# Patient Record
Sex: MALE | Race: OTHER | HISPANIC OR LATINO | Employment: FULL TIME | URBAN - METROPOLITAN AREA
[De-identification: names, ages, dates, MRNs, and addresses within clinical notes are randomized per-mention and may not be internally consistent; named-entity substitution may affect disease eponyms.]

---

## 2021-07-13 ENCOUNTER — OFFICE VISIT (OUTPATIENT)
Dept: FAMILY MEDICINE CLINIC | Facility: CLINIC | Age: 31
End: 2021-07-13
Payer: COMMERCIAL

## 2021-07-13 VITALS
DIASTOLIC BLOOD PRESSURE: 80 MMHG | OXYGEN SATURATION: 99 % | BODY MASS INDEX: 29.92 KG/M2 | WEIGHT: 202 LBS | RESPIRATION RATE: 16 BRPM | HEIGHT: 69 IN | SYSTOLIC BLOOD PRESSURE: 122 MMHG | HEART RATE: 78 BPM

## 2021-07-13 DIAGNOSIS — M54.2 NECK PAIN: ICD-10-CM

## 2021-07-13 DIAGNOSIS — Z13.1 SCREENING FOR DIABETES MELLITUS: ICD-10-CM

## 2021-07-13 DIAGNOSIS — Z13.6 SCREENING FOR CARDIOVASCULAR CONDITION: ICD-10-CM

## 2021-07-13 DIAGNOSIS — R42 SEVERE DIZZINESS: Primary | ICD-10-CM

## 2021-07-13 DIAGNOSIS — N32.89 BLADDER WALL THICKENING: ICD-10-CM

## 2021-07-13 DIAGNOSIS — R07.2 PRECORDIAL PAIN: ICD-10-CM

## 2021-07-13 DIAGNOSIS — Z11.4 SCREENING FOR HIV (HUMAN IMMUNODEFICIENCY VIRUS): ICD-10-CM

## 2021-07-13 DIAGNOSIS — R39.15 URGENCY OF MICTURITION: ICD-10-CM

## 2021-07-13 PROCEDURE — 3725F SCREEN DEPRESSION PERFORMED: CPT | Performed by: FAMILY MEDICINE

## 2021-07-13 PROCEDURE — 99204 OFFICE O/P NEW MOD 45 MIN: CPT | Performed by: FAMILY MEDICINE

## 2021-07-13 PROCEDURE — 3008F BODY MASS INDEX DOCD: CPT | Performed by: FAMILY MEDICINE
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
## 2021-07-14 LAB
ALBUMIN SERPL-MCNC: 4.9 G/DL (ref 3.6–5.1)
ALBUMIN/GLOB SERPL: 1.7 (CALC) (ref 1–2.5)
ALP SERPL-CCNC: 75 U/L (ref 36–130)
ALT SERPL-CCNC: 36 U/L (ref 9–46)
AST SERPL-CCNC: 24 U/L (ref 10–40)
BASOPHILS # BLD AUTO: 20 CELLS/UL (ref 0–200)
BASOPHILS NFR BLD AUTO: 0.3 %
BILIRUB SERPL-MCNC: 0.7 MG/DL (ref 0.2–1.2)
BUN SERPL-MCNC: 16 MG/DL (ref 7–25)
BUN/CREAT SERPL: NORMAL (CALC) (ref 6–22)
CALCIUM SERPL-MCNC: 9.6 MG/DL (ref 8.6–10.3)
CHLORIDE SERPL-SCNC: 103 MMOL/L (ref 98–110)
CHOLEST SERPL-MCNC: 188 MG/DL
CHOLEST/HDLC SERPL: 4.1 (CALC)
CO2 SERPL-SCNC: 27 MMOL/L (ref 20–32)
CREAT SERPL-MCNC: 0.81 MG/DL (ref 0.6–1.35)
EOSINOPHIL # BLD AUTO: 20 CELLS/UL (ref 15–500)
EOSINOPHIL NFR BLD AUTO: 0.3 %
ERYTHROCYTE [DISTWIDTH] IN BLOOD BY AUTOMATED COUNT: 12.5 % (ref 11–15)
GLOBULIN SER CALC-MCNC: 2.9 G/DL (CALC) (ref 1.9–3.7)
GLUCOSE SERPL-MCNC: 96 MG/DL (ref 65–99)
HBA1C MFR BLD: 4.9 % OF TOTAL HGB
HCT VFR BLD AUTO: 47.1 % (ref 38.5–50)
HDLC SERPL-MCNC: 46 MG/DL
HGB BLD-MCNC: 15.9 G/DL (ref 13.2–17.1)
HIV 1+2 AB+HIV1 P24 AG SERPL QL IA: NORMAL
LDLC SERPL CALC-MCNC: 124 MG/DL (CALC)
LYMPHOCYTES # BLD AUTO: 1604 CELLS/UL (ref 850–3900)
LYMPHOCYTES NFR BLD AUTO: 24.3 %
MCH RBC QN AUTO: 28.6 PG (ref 27–33)
MCHC RBC AUTO-ENTMCNC: 33.8 G/DL (ref 32–36)
MCV RBC AUTO: 84.9 FL (ref 80–100)
MONOCYTES # BLD AUTO: 455 CELLS/UL (ref 200–950)
MONOCYTES NFR BLD AUTO: 6.9 %
NEUTROPHILS # BLD AUTO: 4501 CELLS/UL (ref 1500–7800)
NEUTROPHILS NFR BLD AUTO: 68.2 %
NONHDLC SERPL-MCNC: 142 MG/DL (CALC)
PLATELET # BLD AUTO: 163 THOUSAND/UL (ref 140–400)
PMV BLD REES-ECKER: 12.1 FL (ref 7.5–12.5)
POTASSIUM SERPL-SCNC: 4.6 MMOL/L (ref 3.5–5.3)
PROT SERPL-MCNC: 7.8 G/DL (ref 6.1–8.1)
RBC # BLD AUTO: 5.55 MILLION/UL (ref 4.2–5.8)
SL AMB EGFR AFRICAN AMERICAN: 137 ML/MIN/1.73M2
SL AMB EGFR NON AFRICAN AMERICAN: 118 ML/MIN/1.73M2
SODIUM SERPL-SCNC: 138 MMOL/L (ref 135–146)
TRIGL SERPL-MCNC: 81 MG/DL
TSH SERPL-ACNC: 1.63 MIU/L (ref 0.4–4.5)
WBC # BLD AUTO: 6.6 THOUSAND/UL (ref 3.8–10.8)

## 2021-07-28 ENCOUNTER — TELEPHONE (OUTPATIENT)
Dept: NEUROLOGY | Facility: CLINIC | Age: 31
End: 2021-07-28

## 2021-07-28 NOTE — TELEPHONE ENCOUNTER
Best contact number for patient:  [phone]  Emergency Contact name and number:  Keith Durand: [phone]  Referring provider and telephone number:  Adina Ramon 79 Provider Name and if affiliated with [facility]:     Reason for Appointment/Dx:dizziness    Have you seen and followed up with a pediatric Neurologist for this disease in the past?      No (If yes ok to schedule with Dr Kim Daniel)    Neurology Location patient would like to be seen:    Order received? Yes                                                 Records Received? Yes    Have you ever seen another Neurologist?       No    Insurance Information    Insurance Name:    ID/Policy #:    Secondary Insurance:    ID/Policy#: Workman's Comp/ Accident/ School  Information      Workman's Comp/Accident/School related? No    If yes name of Insurance company:    Claim #:    Date of Injury:    Type of Injury:     Name and Telephone Number:    Notes:                   Appointment date: 9/9/21 @ 3pm, w/Dr Cedric Morin in [place]  Verified insurance/address/phone-all current  Sent appt details/directions

## 2021-07-29 ENCOUNTER — OFFICE VISIT (OUTPATIENT)
Dept: FAMILY MEDICINE CLINIC | Facility: CLINIC | Age: 31
End: 2021-07-29
Payer: COMMERCIAL

## 2021-07-29 VITALS
BODY MASS INDEX: 30.07 KG/M2 | WEIGHT: 203 LBS | OXYGEN SATURATION: 98 % | RESPIRATION RATE: 16 BRPM | HEART RATE: 80 BPM | DIASTOLIC BLOOD PRESSURE: 70 MMHG | SYSTOLIC BLOOD PRESSURE: 118 MMHG | HEIGHT: 69 IN

## 2021-07-29 DIAGNOSIS — R07.2 PRECORDIAL PAIN: ICD-10-CM

## 2021-07-29 DIAGNOSIS — E78.2 MIXED HYPERLIPIDEMIA: ICD-10-CM

## 2021-07-29 DIAGNOSIS — R42 SEVERE DIZZINESS: ICD-10-CM

## 2021-07-29 DIAGNOSIS — M54.2 NECK PAIN: Primary | ICD-10-CM

## 2021-07-29 PROCEDURE — 3725F SCREEN DEPRESSION PERFORMED: CPT | Performed by: FAMILY MEDICINE

## 2021-07-29 PROCEDURE — 99214 OFFICE O/P EST MOD 30 MIN: CPT | Performed by: FAMILY MEDICINE

## 2021-07-29 NOTE — ASSESSMENT & PLAN NOTE
Lab Results   Component Value Date    LDLCALC 124 (H) 07/13/2021    advised low-fat diet and try to maintain his weight in good range, his BMI is 30 4   will do yearly labs

## 2021-07-29 NOTE — PROGRESS NOTES
Assessment/Plan:    Problem List Items Addressed This Visit        Other    Neck pain - Primary    Relevant Orders    XR spine cervical 2 or 3 vw injury    Severe dizziness      He says he had MRI of the brain in [facility], he will get the report, he has made appointment with the neurologist and if he has severe symptoms he can go to the er,   will get x-ray of the neck as his symptoms started from the left side of the neck,   he has labs LDL is high but otherwise every other lab is normal         Precordial pain      Pain starts from the neck and goes to was the precordium, he has made appointment with the cardiologist at that time he gets palpitation         Mixed hyperlipidemia     Lab Results   Component Value Date    [address] 124 (H) 07/13/2021    advised low-fat diet and try to maintain his weight in good range, his BMI is 30 4   will do yearly labs            six-month follow-up    Chief Complaint   Patient presents with    Follow-up        Subjective:   Patient ID: Olvin Lamas is a 31 y o  male  Follow-up on his labs, he has symptom of severe dizziness which comes from the left side of the neck pain and pain goes to the left side of the chest and arm, he has made appointment with cardiologist and neurologist, when this episode happen it can last for hours, he says, in the past about 1 year ago he had MRI of the brain which was normal       Review of Systems   Constitutional: Negative for activity change, appetite change, chills, fatigue, fever and unexpected weight change  HENT: Negative for congestion, ear discharge, ear pain, nosebleeds, postnasal drip, rhinorrhea, sinus pressure, sneezing, sore throat, trouble swallowing and voice change  Eyes: Negative for photophobia, pain, discharge, redness and itching  Respiratory: Negative for cough, chest tightness, shortness of breath and wheezing  Cardiovascular: Negative for chest pain, palpitations and leg swelling     Gastrointestinal: Negative for abdominal pain, constipation, diarrhea, nausea and vomiting  Endocrine: Negative for polyuria  Genitourinary: Negative for dysuria, frequency and urgency  Musculoskeletal: Negative for arthralgias, back pain, myalgias and neck pain  Skin: Negative for color change, pallor and rash  Allergic/Immunologic: Negative for environmental allergies and food allergies  Neurological: Positive for dizziness  Negative for seizures, speech difficulty, weakness, light-headedness, numbness and headaches  Hematological: Negative for adenopathy  Does not bruise/bleed easily  Psychiatric/Behavioral: Negative for behavioral problems  The patient is not nervous/anxious  Objective:  Physical Exam  Vitals and nursing note reviewed  Constitutional:       Appearance: He is well-developed  HENT:      Head: Normocephalic and atraumatic  Eyes:      General: No scleral icterus  Right eye: No discharge  Left eye: No discharge  Extraocular Movements: Extraocular movements intact  Neck:      Thyroid: No thyromegaly  Trachea: No tracheal deviation  Cardiovascular:      Rate and Rhythm: Normal rate and regular rhythm  Heart sounds: Normal heart sounds  No murmur heard  Pulmonary:      Effort: Pulmonary effort is normal  No respiratory distress  Breath sounds: Normal breath sounds  No wheezing or rales  Musculoskeletal:         General: No swelling, tenderness, deformity or signs of injury  Normal range of motion  Cervical back: Normal range of motion and neck supple  No rigidity or tenderness  Right lower leg: No edema  Left lower leg: No edema  Lymphadenopathy:      Cervical: No cervical adenopathy  Skin:     General: Skin is warm  Coloration: Skin is not pale  Findings: No erythema or rash  Neurological:      General: No focal deficit present  Mental Status: He is alert and oriented to person, place, and time  Cranial Nerves:  No cranial nerve deficit  Deep Tendon Reflexes: Reflexes are normal and symmetric  Comments:  Does not have any tingling numbness in the arm   Psychiatric:         Behavior: Behavior normal          Thought Content: Thought content normal          Judgment: Judgment normal             History reviewed  No pertinent surgical history  Family History   Problem Relation Age of Onset    No Known Problems Mother     No Known Problems Father        No current outpatient medications on file      No Known Allergies    Vitals:    07/29/21 0759   BP: 118/70   Pulse: 80   Resp: 16   SpO2: 98%   Weight: 92 1 kg (203 lb)   Height: 5' 8 5" (1 74 m)

## 2021-07-29 NOTE — ASSESSMENT & PLAN NOTE
Pain starts from the neck and goes to was the precordium, he has made appointment with the cardiologist at that time he gets palpitation

## 2021-07-29 NOTE — ASSESSMENT & PLAN NOTE
He says he had MRI of the brain in [facility], he will get the report, he has made appointment with the neurologist and if he has severe symptoms he can go to the er,   will get x-ray of the neck as his symptoms started from the left side of the neck,   he has labs LDL is high but otherwise every other lab is normal

## 2021-08-03 ENCOUNTER — CONSULT (OUTPATIENT)
Dept: CARDIOLOGY CLINIC | Facility: CLINIC | Age: 31
End: 2021-08-03
Payer: COMMERCIAL

## 2021-08-03 ENCOUNTER — APPOINTMENT (OUTPATIENT)
Dept: RADIOLOGY | Facility: CLINIC | Age: 31
End: 2021-08-03
Payer: COMMERCIAL

## 2021-08-03 VITALS
DIASTOLIC BLOOD PRESSURE: 74 MMHG | HEART RATE: 80 BPM | TEMPERATURE: 98.9 F | HEIGHT: 69 IN | WEIGHT: 204 LBS | SYSTOLIC BLOOD PRESSURE: 114 MMHG | BODY MASS INDEX: 30.21 KG/M2 | OXYGEN SATURATION: 98 %

## 2021-08-03 DIAGNOSIS — R07.2 PRECORDIAL PAIN: Primary | ICD-10-CM

## 2021-08-03 DIAGNOSIS — R42 DIZZINESS: ICD-10-CM

## 2021-08-03 DIAGNOSIS — M54.2 NECK PAIN: ICD-10-CM

## 2021-08-03 DIAGNOSIS — R07.9 CHEST PAIN IN ADULT: ICD-10-CM

## 2021-08-03 PROCEDURE — 3008F BODY MASS INDEX DOCD: CPT | Performed by: INTERNAL MEDICINE

## 2021-08-03 PROCEDURE — 72040 X-RAY EXAM NECK SPINE 2-3 VW: CPT

## 2021-08-03 PROCEDURE — 1036F TOBACCO NON-USER: CPT | Performed by: INTERNAL MEDICINE

## 2021-08-03 PROCEDURE — 93000 ELECTROCARDIOGRAM COMPLETE: CPT | Performed by: INTERNAL MEDICINE

## 2021-08-03 PROCEDURE — 99213 OFFICE O/P EST LOW 20 MIN: CPT | Performed by: INTERNAL MEDICINE

## 2021-08-03 NOTE — PROGRESS NOTES
Consultation - Cardiology Office  [facility]  Johana Tucker 31 y o  male MRN: [id number]  : 1990  Unit/Bed#:  Encounter: [id number]      ASSESSMENT:  Chest pain  Starts from the neck and goes down to the precordium, accompanied by  Palpitations    X-ray of cervical spine was done today:  Results are pending    Dizziness      RECOMMENDATIONS:  48 hour Holter monitor  Echocardiogram  Exercise stress test    Patient has also been referred to neurology also by PCP      Thank you for your consultation  If you have any question please call me at [phone]      Primary Care Physician Requesting Consult: John Villa MD      Reason for Consult / Principal Problem:  Chest pain and palpitations        HPI :     Johana Tucker is a 31y o  year old male who was referred by primary care doctor for evaluation of chest pain and palpitations and sometimes dizziness  According to the patient usually starts with pain on the left side of his neck and travels down to his mid chest sometimes accompanied by palpitations and off and on dizziness  This occurs more frequently when he is driving  He does not give a history of syncope  Patient also had x-ray of his cervical spine today and is waiting for a appointment with neurologist    Review of Systems   Cardiovascular: Positive for chest pain  Neurological: Positive for dizziness and light-headedness  Historical Information   Past Medical History:   Diagnosis Date    Depression     Vertigo      No past surgical history on file    Social History     Substance and Sexual Activity   Alcohol Use Yes    Alcohol/week: 3 0 standard drinks    Types: 3 Glasses of wine per week     Social History     Substance and Sexual Activity   Drug Use Not Currently     Social History     Tobacco Use   Smoking Status Never Smoker   Smokeless Tobacco Never Used     Family History:   Family History   Problem Relation Age of Onset    No Known Problems Mother  No Known Problems Father        Meds/Allergies     No Known Allergies  No current outpatient medications on file  Vitals:   /74 mmHg  HR 80/Min  BP Readings from Last 3 Encounters:   07/29/21 118/70   07/13/21 122/80       Physical Exam  PHYSICAL EXAMINATION:  Neurologic:  Alert & oriented x 3, no new focal deficits, Not in any acute distress,  Constitutional:  Well developed, well nourished, non-toxic appearance   Eyes:  Pupil equal and reacting to light, conjunctiva normal, No JVP, No LNP   HENT:  Atraumatic, oropharynx moist, Neck- normal range of motion, no tenderness,  Neck supple   Respiratory:  Bilateral air entry, mostly clear to auscultation  Cardiovascular: S1-S2 regular with a I/VI systolic murmur   GI:  Soft, nondistended, normal bowel sounds, nontender, no hepatosplenomegaly appreciated  Musculoskeletal:  No edema, no tenderness, no deformities  Skin:  Well hydrated, no rash   Lymphatic:  No lymphadenopathy noted   Extremities:  No edema and distal pulses are present    Diagnostic Studies Review Cardio:      EKG:  Normal sinus rhythm, heart rate 80 per minute    Cardiac testing:   No results found for this or any previous visit  Imaging:  Chest X-Ray:   No Chest XR results available for this patient  CT-scan of the chest:     No CTA results available for this patient    Lab Review   Lab Results   Component Value Date    WBC 6 6 07/13/2021    HGB 15 9 07/13/2021    HCT 47 1 07/13/2021    MCV 84 9 07/13/2021    RDW 12 5 07/13/2021     07/13/2021     BMP:  Lab Results   Component Value Date    SODIUM 138 07/13/2021    K 4 6 07/13/2021     07/13/2021    CO2 27 07/13/2021    BUN 16 07/13/2021    CREATININE 0 81 07/13/2021    GLUC 96 07/13/2021    CALCIUM 9 6 07/13/2021     LFT:  Lab Results   Component Value Date    AST 24 07/13/2021    ALT 36 07/13/2021    ALKPHOS 75 07/13/2021    TP 7 8 07/13/2021    ALB 4 9 07/13/2021      No results found for: [id number]  No components found for: [employer] [facility]  Lab Results   Component Value Date    HGBA1C 4 9 07/13/2021     Lipid Profile:   Lab Results   Component Value Date    CHOLESTEROL 188 07/13/2021    HDL 46 07/13/2021    LDLCALC 124 (H) 07/13/2021    TRIG 81 07/13/2021     Lab Results   Component Value Date    CHOLESTEROL 188 07/13/2021     No results found for: CKTOTAL, CKMB, CKMBINDEX, TROPONINI  No results found for: NTBNP   Recent Results (from the past 672 hour(s))   Lipid panel    Collection Time: 07/13/21  9:55 AM   Result Value Ref Range    Total Cholesterol 188 <200 mg/dL    HDL 46 > OR = 40 mg/dL    Triglycerides 81 <150 mg/dL    LDL Calculated 124 (H) mg/dL (calc)    Chol HDLC Ratio 4 1 <5 0 (calc)    Non-HDL Cholesterol 142 (H) <130 mg/dL (calc)   Comprehensive metabolic panel    Collection Time: 07/13/21  9:55 AM   Result Value Ref Range    Glucose, Random 96 65 - 99 mg/dL    BUN 16 7 - 25 mg/dL    Creatinine 0 81 0 60 - 1 35 mg/dL    eGFR Non  118 > OR = 60 mL/min/1 73m2    eGFR  137 > OR = 60 mL/min/1 73m2    SL AMB BUN/CREATININE RATIO NOT APPLICABLE 6 - 22 (calc)    Sodium 138 135 - 146 mmol/L    Potassium 4 6 3 5 - 5 3 mmol/L    Chloride 103 98 - 110 mmol/L    CO2 27 20 - 32 mmol/L    Calcium 9 6 8 6 - 10 3 mg/dL    Protein, Total 7 8 6 1 - 8 1 g/dL    Albumin 4 9 3 6 - 5 1 g/dL    Globulin 2 9 1 9 - 3 7 g/dL (calc)    Albumin/Globulin Ratio 1 7 1 0 - 2 5 (calc)    TOTAL BILIRUBIN 0 7 0 2 - 1 2 mg/dL    Alkaline Phosphatase 75 36 - 130 U/L    AST 24 10 - 40 U/L    ALT 36 9 - 46 U/L   CBC and differential    Collection Time: 07/13/21  9:55 AM   Result Value Ref Range    White Blood Cell Count 6 6 3 8 - 10 8 Thousand/uL    Red Blood Cell Count 5 55 4 20 - 5 80 Million/uL    Hemoglobin 15 9 13 2 - 17 1 g/dL    HCT 47 1 38 5 - 50 0 %    MCV 84 9 80 0 - 100 0 fL    MCH 28 6 27 0 - 33 0 pg    MCHC 33 8 32 0 - 36 0 g/dL    RDW 12 5 11 0 - 15 0 %    Platelet Count 604 596 - 400 Thousand/uL    SL AMB MPV 12 1 7 5 - 12 5 fL    Neutrophils (Absolute) 4,501 1,500 - 7,800 cells/uL    Lymphocytes (Absolute) 1,604 850 - 3,900 cells/uL    Monocytes (Absolute) 455 200 - 950 cells/uL    Eosinophils (Absolute) 20 15 - 500 cells/uL    Basophils ABS 20 0 - 200 cells/uL    Neutrophils 68 2 %    Lymphocytes 24 3 %    Monocytes 6 9 %    Eosinophils 0 3 %    Basophils PCT 0 3 %   Human Immunodeficiency Virus 1/2 Antigen / Antibody ( Fourth Generation) with Reflex Testing    Collection Time: 07/13/21  9:55 AM   Result Value Ref Range    HIV AG/AB, 4th Gen NON-REACTIVE NON-REACTIVE   TSH, 3rd generation    Collection Time: 07/13/21  9:55 AM   Result Value Ref Range    TSH 1 63 0 40 - 4 50 mIU/L   Hemoglobin A1c (w/out EAG)    Collection Time: 07/13/21  9:55 AM   Result Value Ref Range    Hemoglobin A1C 4 9 <5 7 % of total Hgb           Dr Debbie Godwin MD, [facility]      "This note has been constructed using a voice recognition system  Therefore there may be syntax, spelling, and/or grammatical errors   Please call if you have any questions  "

## 2021-08-31 ENCOUNTER — CONSULT (OUTPATIENT)
Dept: NEUROLOGY | Facility: CLINIC | Age: 31
End: 2021-08-31
Payer: COMMERCIAL

## 2021-08-31 VITALS
BODY MASS INDEX: 30.81 KG/M2 | WEIGHT: 205.6 LBS | HEART RATE: 72 BPM | SYSTOLIC BLOOD PRESSURE: 131 MMHG | DIASTOLIC BLOOD PRESSURE: 82 MMHG | TEMPERATURE: 98.1 F

## 2021-08-31 DIAGNOSIS — M54.2 NECK PAIN: ICD-10-CM

## 2021-08-31 DIAGNOSIS — R55 POSTURAL DIZZINESS WITH NEAR SYNCOPE: Primary | ICD-10-CM

## 2021-08-31 DIAGNOSIS — R42 POSTURAL DIZZINESS WITH NEAR SYNCOPE: Primary | ICD-10-CM

## 2021-08-31 DIAGNOSIS — R42 SEVERE DIZZINESS: ICD-10-CM

## 2021-08-31 PROCEDURE — 99205 OFFICE O/P NEW HI 60 MIN: CPT | Performed by: PSYCHIATRY & NEUROLOGY

## 2021-08-31 PROCEDURE — 1036F TOBACCO NON-USER: CPT | Performed by: PSYCHIATRY & NEUROLOGY

## 2021-08-31 NOTE — PROGRESS NOTES
Patient ID: Mihir Tanner is a 31 y o  male  Assessment/Plan:    No problem-specific Assessment & Plan notes found for this encounter  Problem List Items Addressed This Visit        Other    Neck pain    Relevant Orders    XR spine cervical complete 6+ vw flex/ext/obl    EEG Routine and awake    Severe dizziness    Relevant Orders    XR spine cervical complete 6+ vw flex/ext/obl    EEG Routine and awake    VAS carotid complete study      Other Visit Diagnoses     Postural dizziness with near syncope    -  Primary    Relevant Orders    EEG Routine and awake    VAS carotid complete study    Vitamin B12    Homocysteine, serum         Mr Tivis Ormond described bouts of near syncope which started several years back  MRI from 2019 was reported no intracranial pathology, as per the patient, as I do not had imaging for my review  We extensively discussed potential differential, including cardiac pathology, vasovagal syndrome, partial seizures, hypoglycemic events, vitamin B12 deficiency  Patient required B12 treatment previously  Patient is to follow with cardiology to complete 48 hour Holter monitoring/Stress test and 2DEcho;   I will be considering EEG with VAS Carotids and cervical x-ray with flexion/extension, as patient has no signs of degenerative process on his recent imaging  Patient was advised completing blood work and evaluating his blood sugar level during his bouts of lightheadedness, with concern for hypoglycemia  No focal neurologic dysfunction has been reported  ENT team completed work up with no vertigo or peripheral nerve dysfunction described  Patient is to follow in 6-8 weeks  Subjective: bouts of lightheadedness or near-syncopy    HPI    Mr Tivis Ormond is a 32 yo male presented for evaluation of dizziness  Patient presented with his wife today  Patient described bouts of episodic lightheadedness since 2019, with worsening frequency intensity has been described    Patient can develop sensation like he is following while during, with no loss of consciousness has been described  The we patient may have dose events comes and goes for 1 or 2 weeks then he has complete improvement  During this events patient also have intense spasm in left anterior part of his neck and upper chest described as soreness in the muscle, patient required to massage this area to help with improving in his symptoms  Patient also reported intermittent numbness in his hands especially in the morning time, left lower extremity pain is on an off  Patient has no focal weakness or sensory loss, no vision loss, no double vision has been described  Patient had completed workup back in 2019 with brain MRI suggest normal findings  Patient had completed x-ray of the cervical spine recently, normal findings has been described  Workup was completed by otolaryngology, no pathology noted with no consideration of vertigo from the patient's standpoint  Patient described events of lightheadedness getting more intense recently, at times it interferes with his work activities including climbing the ladder  The following portions of the patient's history were reviewed and updated as appropriate:   He  has a past medical history of Depression and Vertigo  He   Patient Active Problem List    Diagnosis Date Noted    Mixed hyperlipidemia 07/29/2021    Neck pain 07/13/2021    Severe dizziness 07/13/2021    Urgency of micturition 07/13/2021    Screening for diabetes mellitus 07/13/2021    Precordial pain 07/13/2021    BMI 30 0-30 9,adult 07/13/2021    Bladder wall thickening 11/05/2019     He  has no past surgical history on file  His family history includes Diabetes in his maternal grandmother; No Known Problems in his father and mother  He  reports that he has never smoked  He has never used smokeless tobacco  He reports current alcohol use of about 3 0 standard drinks of alcohol per week  He reports previous drug use    No current outpatient medications on file  No current facility-administered medications for this visit  No current outpatient medications on file prior to visit  No current facility-administered medications on file prior to visit  He has No Known Allergies            Objective:    Blood pressure 131/82, pulse 72, temperature 98 1 °F (36 7 °C), temperature source Skin, weight 93 3 kg (205 lb 9 6 oz)  Physical Exam    Neurological Exam    CONSTITUTIONAL: NAD, pleasant  NECK: supple, no lymphadenopathy, no thyromegaly, no JVD  CARDIOVASCULAR: RRR, normal S1S2, no murmurs, no rubs  RESP: clear to auscultation bilaterally, no wheezes/rhonchi/rales  ABDOMEN: soft, non tender, non distended  SKIN: no rash or skin lesions  EXTREMITIES: no edema, pulses 2+bilaterally  PSYCH: appropriate mood and affect  NEUROLOGIC COMPREHENSIVE EXAM: Patient is oriented to person, place and time, NAD; appropriate affect  CN II, III, IV, V, VI, VII,VIII,IX,X,XI-XII intact with EOMI, PERRLA, OKN intact, VF grossly intact, fundi poorly visualized secondary to pupillary constriction; symmetric face noted  Motor: 5/5 UE/LE bilateral symmetric; Sensory: intact to light touch and pinprick bilaterally; normal vibration sensation feet bilaterally; Coordination within normal limits on FTN and REAGAN testing; DTR: 2/4 through, no Babinski, no clonus  Tandem gait is intact  Romberg: negative  ROS:  12 points of review of system was reviewed with the patient and was unremarkable with exception: see HPI  Review of Systems   Constitutional: Negative  Negative for appetite change and fever  HENT: Negative  Negative for hearing loss, tinnitus, trouble swallowing and voice change  Eyes: Negative  Negative for photophobia and pain  Respiratory: Negative  Negative for shortness of breath  Cardiovascular: Negative  Negative for palpitations  Gastrointestinal: Negative  Negative for nausea and vomiting  Endocrine: Negative  Negative for cold intolerance  Genitourinary: Negative  Negative for dysuria, frequency and urgency  Musculoskeletal: Positive for neck pain  Negative for myalgias  Skin: Negative  Negative for rash  Neurological: Positive for weakness (left leg weakness) and headaches  Negative for dizziness, tremors, seizures, syncope, facial asymmetry, speech difficulty, light-headedness and numbness  Sensation feeling like his falling and off balance, pain on the left side of the neck    Hematological: Negative  Does not bruise/bleed easily  Psychiatric/Behavioral: Negative  Negative for confusion, hallucinations and sleep disturbance

## 2021-09-05 LAB — VIT B12 SERPL-MCNC: 269 PG/ML (ref 200–1100)

## 2021-09-07 DIAGNOSIS — E53.8 LOW SERUM VITAMIN B12: Primary | ICD-10-CM

## 2021-09-09 ENCOUNTER — TELEPHONE (OUTPATIENT)
Dept: NEUROLOGY | Facility: CLINIC | Age: 31
End: 2021-09-09

## 2021-09-09 NOTE — TELEPHONE ENCOUNTER
----- Message from Michell Beal MD sent at 9/7/2021  4:45 PM EDT -----  Please review abnormal low B12  with the patient- B12 1000 mcg SL daily advised

## 2021-09-09 NOTE — TELEPHONE ENCOUNTER
Called patient  Received voicemail  Left detailed message (per consent in chart) regarding results and recommendations below  Advised patient to call office if any questions/concerns

## 2021-09-13 ENCOUNTER — HOSPITAL ENCOUNTER (OUTPATIENT)
Dept: NON INVASIVE DIAGNOSTICS | Facility: HOSPITAL | Age: 31
Discharge: HOME | End: 2021-09-13
Payer: COMMERCIAL

## 2021-09-13 DIAGNOSIS — R42 DIZZINESS: ICD-10-CM

## 2021-09-13 DIAGNOSIS — R07.9 CHEST PAIN IN ADULT: ICD-10-CM

## 2021-09-13 DIAGNOSIS — R07.2 PRECORDIAL PAIN: ICD-10-CM

## 2021-09-13 LAB
CHEST PAIN STATEMENT: NORMAL
MAX DIASTOLIC BP: 78 MMHG
MAX HEART RATE: 179 BPM
MAX PREDICTED HEART RATE: 189 BPM
MAX. SYSTOLIC BP: 192 MMHG
PROTOCOL NAME: NORMAL
REASON FOR TERMINATION: NORMAL
TARGET HR FORMULA: NORMAL
TEST INDICATION: NORMAL
TIME IN EXERCISE PHASE: NORMAL

## 2021-09-13 PROCEDURE — 93018 CV STRESS TEST I&R ONLY: CPT | Performed by: INTERNAL MEDICINE

## 2021-09-13 PROCEDURE — 93016 CV STRESS TEST SUPVJ ONLY: CPT | Performed by: INTERNAL MEDICINE

## 2021-09-13 PROCEDURE — 93225 XTRNL ECG REC<48 HRS REC: CPT

## 2021-09-13 PROCEDURE — 93017 CV STRESS TEST TRACING ONLY: CPT

## 2021-09-13 PROCEDURE — 93226 XTRNL ECG REC<48 HR SCAN A/R: CPT

## 2021-09-14 ENCOUNTER — TELEPHONE (OUTPATIENT)
Dept: CARDIOLOGY CLINIC | Facility: CLINIC | Age: 31
End: 2021-09-14

## 2021-09-14 NOTE — TELEPHONE ENCOUNTER
----- Message from Jayme Cruz MD sent at 9/14/2021  9:06 AM EDT -----  Please inform the patient that the stress test showed NO evidence of any significant blockage in the blood vessels of your heart

## 2021-09-15 ENCOUNTER — HOSPITAL ENCOUNTER (OUTPATIENT)
Dept: NON INVASIVE DIAGNOSTICS | Facility: HOSPITAL | Age: 31
Discharge: HOME | End: 2021-09-15
Attending: PSYCHIATRY & NEUROLOGY
Payer: COMMERCIAL

## 2021-09-15 ENCOUNTER — HOSPITAL ENCOUNTER (OUTPATIENT)
Dept: NEUROLOGY | Facility: CLINIC | Age: 31
Discharge: HOME | End: 2021-09-15
Payer: COMMERCIAL

## 2021-09-15 ENCOUNTER — HOSPITAL ENCOUNTER (OUTPATIENT)
Dept: RADIOLOGY | Facility: HOSPITAL | Age: 31
Discharge: HOME | End: 2021-09-15
Attending: PSYCHIATRY & NEUROLOGY
Payer: COMMERCIAL

## 2021-09-15 DIAGNOSIS — M54.2 NECK PAIN: ICD-10-CM

## 2021-09-15 DIAGNOSIS — R55 POSTURAL DIZZINESS WITH NEAR SYNCOPE: ICD-10-CM

## 2021-09-15 DIAGNOSIS — R42 POSTURAL DIZZINESS WITH NEAR SYNCOPE: ICD-10-CM

## 2021-09-15 DIAGNOSIS — R42 SEVERE DIZZINESS: ICD-10-CM

## 2021-09-15 PROCEDURE — 95816 EEG AWAKE AND DROWSY: CPT | Performed by: PSYCHIATRY & NEUROLOGY

## 2021-09-15 PROCEDURE — 72052 X-RAY EXAM NECK SPINE 6/>VWS: CPT

## 2021-09-15 PROCEDURE — 95816 EEG AWAKE AND DROWSY: CPT

## 2021-09-15 PROCEDURE — 93880 EXTRACRANIAL BILAT STUDY: CPT

## 2021-09-20 ENCOUNTER — TELEPHONE (OUTPATIENT)
Dept: CARDIOLOGY CLINIC | Facility: CLINIC | Age: 31
End: 2021-09-20

## 2021-09-20 PROCEDURE — 93227 XTRNL ECG REC<48 HR R&I: CPT | Performed by: INTERNAL MEDICINE

## 2021-09-20 NOTE — TELEPHONE ENCOUNTER
----- Message from Feroz Willis MD sent at 9/20/2021 12:12 PM EDT -----  Please call and inform patient that the Holter monitor was reviewed    Predominant rhythm was normal  This did not reveal any significant abnormality or arrhythmia  No symptoms were recorded by the patient  Will discuss further at next office visit

## 2021-09-22 ENCOUNTER — TELEPHONE (OUTPATIENT)
Dept: NEUROLOGY | Facility: CLINIC | Age: 31
End: 2021-09-22

## 2021-09-22 PROCEDURE — 93880 EXTRACRANIAL BILAT STUDY: CPT | Performed by: SURGERY

## 2021-09-22 NOTE — TELEPHONE ENCOUNTER
Spoke with patient reschedule 10-26-21 7:30am with Dr [facility] Provider out patient will call back to reschedule once he has new insurance

## 2022-10-12 PROBLEM — Z13.1 SCREENING FOR DIABETES MELLITUS: Status: RESOLVED | Noted: 2021-07-13 | Resolved: 2022-10-12

## 2023-03-14 PROBLEM — R07.89 CHEST TIGHTNESS: Status: ACTIVE | Noted: 2021-01-25

## 2023-03-14 PROBLEM — R42 DISEQUILIBRIUM: Status: ACTIVE | Noted: 2021-01-25

## 2023-03-14 PROBLEM — M54.2 NECK PAIN: Status: RESOLVED | Noted: 2021-07-13 | Resolved: 2023-03-14

## 2023-03-14 PROBLEM — R39.89 BLADDER PAIN: Status: ACTIVE | Noted: 2019-11-05

## 2023-03-14 PROBLEM — R00.2 PALPITATIONS: Status: ACTIVE | Noted: 2021-01-25

## 2023-03-15 ENCOUNTER — OFFICE VISIT (OUTPATIENT)
Age: 33
End: 2023-03-15

## 2023-03-15 VITALS
HEART RATE: 78 BPM | DIASTOLIC BLOOD PRESSURE: 84 MMHG | OXYGEN SATURATION: 98 % | BODY MASS INDEX: 32.88 KG/M2 | TEMPERATURE: 97.6 F | WEIGHT: 216.93 LBS | HEIGHT: 68 IN | RESPIRATION RATE: 14 BRPM | SYSTOLIC BLOOD PRESSURE: 128 MMHG

## 2023-03-15 DIAGNOSIS — R53.83 OTHER FATIGUE: ICD-10-CM

## 2023-03-15 DIAGNOSIS — N32.89 BLADDER WALL THICKENING: ICD-10-CM

## 2023-03-15 DIAGNOSIS — R39.89 BLADDER PAIN: ICD-10-CM

## 2023-03-15 DIAGNOSIS — R42 POSTURAL DIZZINESS WITH NEAR SYNCOPE: Primary | ICD-10-CM

## 2023-03-15 DIAGNOSIS — R07.89 CHEST TIGHTNESS: ICD-10-CM

## 2023-03-15 DIAGNOSIS — R55 POSTURAL DIZZINESS WITH NEAR SYNCOPE: Primary | ICD-10-CM

## 2023-03-15 DIAGNOSIS — R42 DISEQUILIBRIUM: ICD-10-CM

## 2023-03-15 DIAGNOSIS — R06.83 LOUD SNORING: ICD-10-CM

## 2023-03-15 LAB
SL AMB  POCT GLUCOSE, UA: NORMAL
SL AMB LEUKOCYTE ESTERASE,UA: NORMAL
SL AMB POCT BILIRUBIN,UA: NORMAL
SL AMB POCT BLOOD,UA: NORMAL
SL AMB POCT CLARITY,UA: CLEAR
SL AMB POCT COLOR,UA: YELLOW
SL AMB POCT KETONES,UA: NORMAL
SL AMB POCT NITRITE,UA: NORMAL
SL AMB POCT PH,UA: 5
SL AMB POCT SPECIFIC GRAVITY,UA: 1.03
SL AMB POCT URINE PROTEIN: NORMAL
SL AMB POCT UROBILINOGEN: NORMAL

## 2023-03-15 NOTE — PATIENT INSTRUCTIONS
Dizziness   WHAT YOU NEED TO KNOW:   Dizziness is a feeling of being off balance or unsteady  Common causes of dizziness are an inner ear fluid imbalance or a lack of oxygen in your blood  Dizziness may be acute (lasts 3 days or less) or chronic (lasts longer than 3 days)  You may have dizzy spells that last from seconds to a few hours  DISCHARGE INSTRUCTIONS:   Return to the emergency department if:   You have a headache and a stiff neck  You have shaking chills and a fever  You vomit over and over with no relief  Your vomit or bowel movements are red or black  You have pain in your chest, back, or abdomen  You have numbness, especially in your face, arms, or legs  You have trouble moving your arms or legs  You are confused  Contact your healthcare provider if:   You have a fever  Your symptoms do not get better with treatment  You have questions or concerns about your condition or care  Manage your symptoms:   Do not drive  or operate heavy machinery when you are dizzy  Get up slowly  from sitting or lying down  Drink plenty of liquids  Liquids help prevent dehydration  Ask how much liquid to drink each day and which liquids are best for you  Follow up with your doctor as directed:  Write down your questions so you remember to ask them during your visits  © Copyright [employer] 2022 Information is for End User's use only and may not be sold, redistributed or otherwise used for commercial purposes  The above information is an  only  It is not intended as medical advice for individual conditions or treatments  Talk to your doctor, nurse or pharmacist before following any medical regimen to see if it is safe and effective for you

## 2023-03-15 NOTE — ASSESSMENT & PLAN NOTE
Patient here today to reestablish care with a PCP at  [place] [address]  Patient has had postural dizziness with near syncope for over 5 years, pt reports "it feels like I'm falling through the floor"  Patient reports symptoms occur with right-sided neck pain which radiates down to the clavicle  Patient reports that when symptoms occur he feels like he may pass out  Patient denies ever passing out  Patient has had multiple work-ups for symptoms  Neuro exam negative today  Patient reports he had an MRI of the brain done in 2019  Patient had vascular carotid study, stress test, EKG, EEG, as well as cervical x-ray which were all WNL  Pt was evaluated by ENT, and all testing was negative  Patient would like to follow back up with cardiology and neurology  Patient reports that his insurance changed and he needs new referrals back to the specialist    Most recent labs were all normal with the exception of elevated LDL  Will repeat labs at this time  ED precautions discussed

## 2023-03-15 NOTE — PROGRESS NOTES
Name: Ellis Bunch      : 1990      MRN: [id number]  Encounter Provider: TREVOR Randolph  Encounter Date: 3/15/2023   Encounter department: [address] [place] [address] PRIMARY CARE    Assessment & Plan     1  Postural dizziness with near syncope  Assessment & Plan:  Patient here today to reestablish care with a PCP at  [place] [address]  Patient has had postural dizziness with near syncope for over 5 years, pt reports "it feels like I'm falling through the floor"  Patient reports symptoms occur with right-sided neck pain which radiates down to the clavicle  Patient reports that when symptoms occur he feels like he may pass out  Patient denies ever passing out  Patient has had multiple work-ups for symptoms  Neuro exam negative today  Patient reports he had an MRI of the brain done in 2019  Patient had vascular carotid study, stress test, EKG, EEG, as well as cervical x-ray which were all WNL  Pt was evaluated by ENT, and all testing was negative  Patient would like to follow back up with cardiology and neurology  Patient reports that his insurance changed and he needs new referrals back to the specialist    Most recent labs were all normal with the exception of elevated LDL  Will repeat labs at this time  ED precautions discussed  Orders:  -     Ambulatory Referral to Cardiology; Future  -     Ambulatory Referral to Neurology; Future  -     Comprehensive metabolic panel; Future  -     TSH, 3rd generation with Free T4 reflex; Future  -     Vitamin D 25 hydroxy; Future    2  Chest tightness  Assessment & Plan:  Patient reports long history of chest tightness and pain associated with dizziness  Patient previously seeing cardiology however insurance changed and he is requesting a new referral   Patient had an EKG, stress test, all of which were normal   ED precautions discussed with patient  Orders:  -     Ambulatory Referral to Cardiology; Future    3   Disequilibrium  Assessment & Plan:  Patient here today to reestablish care with a PCP at  [place] [address]  Patient has had postural dizziness with near syncope for over 5 years, pt reports he feels like "he is falling through the floor"  Patient reports symptoms occur with right-sided neck pain which radiates down to the clavicle  Patient reports that when symptoms occur he feels like he may pass out  Patient denies ever passing out  Patient has had multiple work-ups for symptoms  Neuro exam negative today  Patient reports he had an MRI of the brain done in 2019  Patient had vascular carotid study, stress test, EKG, EEG, as well as cervical x-ray which were within normal limits  Patient would like to follow back up with cardiology and neurology  Patient reports that his insurance changed and he needs new referrals back to the specialist   Most recent labs were all normal with the exception of elevated LDL  Will repeat labs at this time  ED precautions discussed  Orders:  -     Ambulatory Referral to Cardiology; Future  -     Ambulatory Referral to Neurology; Future    4  Other fatigue  Assessment & Plan:  Patient reports years of feeling fatigued  Reports he does not wake up feeling refreshed from sleep  Wife is reporting loud snoring at night  Patient notes he gets 6 to 7 hours of sleep per night  Patient is obese  Patient is requesting referral to sleep medicine for possible sleep study  Referral placed today  Orders:  -     Ambulatory referral to Sleep Medicine; Future  -     CBC and differential; Future  -     Vitamin B12; Future  -     Vitamin D 25 hydroxy; Future    5  Loud snoring  Assessment & Plan:  Patient reports years of feeling fatigued  Reports he does not wake up feeling refreshed from sleep  Wife is reporting loud snoring at night  Patient notes he gets 6 to 7 hours of sleep per night  Patient is obese  Patient is requesting referral to sleep medicine for possible sleep study  Referral placed today      Orders:  -     Ambulatory referral to Sleep Medicine; Future    6  Bladder pain  Assessment & Plan:  Patient reporting bladder fullness sensation  Patient was previously seeing urology however insurance changed  Patient is requesting a referral back to urology  Patient was supposed to receive a cystoscopy prior to insurance change  Ultrasound of 2019 showed bladder wall thickening  POC UA Negative today  Orders:  -     POCT urine dip auto non-scope    7  Bladder wall thickening  Assessment & Plan:  Patient with history of bladder pain for many years  Patient reports pain feels like bladder fullness  Patient was previously seeing urology and was scheduled for cystoscopy however insurance changed  Patient is requesting a new referral to urology  Ultrasound in 2019 showed mild bladder wall thickening  Point-of-care UA today was negative  Orders:  -     Ambulatory Referral to Urology; Future    8  BMI 32 0-32 9,adult  Assessment & Plan:  Encouraged well-balanced diet, limiting sugary beverages, tracking calories  Exercise goal of 30 minutes/day for 5 days a week  Orders:  -     Comprehensive metabolic panel; Future  -     Lipid Panel with Direct LDL reflex; Future  -     TSH, 3rd generation with Free T4 reflex; Future    BMI Counseling: Body mass index is 32 98 kg/m²  The BMI is above normal  Nutrition recommendations include decreasing portion sizes, encouraging healthy choices of fruits and vegetables, decreasing fast food intake, consuming healthier snacks, limiting drinks that contain sugar, moderation in carbohydrate intake, increasing intake of lean protein, reducing intake of saturated and trans fat and reducing intake of cholesterol  Rationale for BMI follow-up plan is due to patient being overweight or obese  Depression Screening and Follow-up Plan: Patient was screened for depression during today's encounter  They screened negative with a PHQ-2 score of 0          Subjective      Patient here today as a new patient  Patient requesting new referrals to neurology, cardiology, urology, and sleep medicine  Patient was seeing previous PCP through [place] [address], as well as specialist, however insurance changed and patient is now requesting a referrals  Patient has a history of dizziness which he states is "feeling like falling through floor "  Patient reports when he has this dizziness, he feels like he may pass out, occasionally vision goes blurry, and he has pain from his neck to his chest   Patient states when symptoms occur his face gets red  Patient reports symptoms can last from 15 minutes up to multiple days  Patient denies ever passing out  Patient said he has seen ENT in the past and all testing was returned normal   Patient states this feeling occurs often when driving, however reports it does occur at multiple random times  Patient reports this feeling has been going on for over 5 years  Patient reports his symptoms are worsening  Patient notes there are times when symptoms resolve for 5 to 6 months, however he notes that they always return  Patient has been given a glucose monitor in the past to test blood sugars when he has the symptoms and notes that blood sugars were always normal   Patient reports that he does drink plenty of water  Patient saw cardiology previously and had a stress test, Vascular carodid ultrasound, and labs which returned normal   Patient was supposed to get an echocardiogram and was unable to complete  Patiaortient would like to follow back up with cardiology  Patient saw neurology and had an EEG performed which was normal     Patient is also requesting a referral back to urology  Patient states that he has bladder pain off and on for many years  Patient did have an ultrasound performed in 2019 which did show mild bladder wall thickening  Patient denies this pain with urination, new sexual partners, blood in urine, fevers, or back pain    Patient reports pain feels like a full bladder sensation despite going to the bathroom  Review of Systems   Constitutional: Negative  HENT: Negative  Eyes: Negative  Respiratory: Negative  Cardiovascular: Positive for chest pain  Negative for palpitations and leg swelling  Gastrointestinal: Negative  Genitourinary: Negative for decreased urine volume, difficulty urinating, dysuria, enuresis, flank pain, frequency, genital sores, hematuria, penile discharge, penile pain, penile swelling, scrotal swelling, testicular pain and urgency  Reports bladder pain/feeling of bladder fullness   Musculoskeletal: Positive for neck pain (right lateral neck pain which radiates to colar during dizzy episode)  Skin: Negative  Neurological: Positive for dizziness and light-headedness  Negative for tremors, seizures, syncope, facial asymmetry, speech difficulty, weakness, numbness and headaches  Hematological: Negative  Psychiatric/Behavioral: Negative  Current Outpatient Medications on File Prior to Visit   Medication Sig   • Cyanocobalamin 1000 MCG SUBL Place 1 tablet (1,000 mcg total) under the tongue daily       Objective     /84 (BP Location: Right arm, Patient Position: Sitting, Cuff Size: Large)   Pulse 78   Temp 97 6 °F (36 4 °C) (Tympanic)   Resp 14   Ht 5' 8" (1 727 m)   Wt 98 4 kg (216 lb 14 9 oz)   SpO2 98%   BMI 32 98 kg/m²     Physical Exam  Vitals and nursing note reviewed  Constitutional:       Appearance: Normal appearance  HENT:      Head: Normocephalic and atraumatic  Right Ear: Tympanic membrane, ear canal and external ear normal  There is no impacted cerumen  Left Ear: Tympanic membrane, ear canal and external ear normal  There is no impacted cerumen  Nose: Nose normal  No congestion  Mouth/Throat:      Mouth: Mucous membranes are moist       Pharynx: Oropharynx is clear  Eyes:      General:         Right eye: No discharge  Left eye: No discharge  Conjunctiva/sclera: Conjunctivae normal       Pupils: Pupils are equal, round, and reactive to light  Cardiovascular:      Rate and Rhythm: Normal rate and regular rhythm  Pulses: Normal pulses  Heart sounds: Normal heart sounds  Pulmonary:      Effort: Pulmonary effort is normal       Breath sounds: Normal breath sounds  Abdominal:      General: Abdomen is flat  Palpations: Abdomen is soft  Musculoskeletal:         General: Normal range of motion  Cervical back: Normal range of motion  Right lower leg: No edema  Left lower leg: No edema  Skin:     General: Skin is warm and dry  Capillary Refill: Capillary refill takes less than 2 seconds  Neurological:      General: No focal deficit present  Mental Status: He is alert and oriented to person, place, and time  Comments: Michele Barragan   Psychiatric:         Mood and Affect: Mood normal          Behavior: Behavior normal          Thought Content:  Thought content normal          Judgment: Judgment normal        TREVOR Champion

## 2023-03-15 NOTE — ASSESSMENT & PLAN NOTE
Patient reports years of feeling fatigued  Reports he does not wake up feeling refreshed from sleep  Wife is reporting loud snoring at night  Patient notes he gets 6 to 7 hours of sleep per night  Patient is obese  Patient is requesting referral to sleep medicine for possible sleep study  Referral placed today

## 2023-03-15 NOTE — ASSESSMENT & PLAN NOTE
Patient reporting bladder fullness sensation  Patient was previously seeing urology however insurance changed  Patient is requesting a referral back to urology  Patient was supposed to receive a cystoscopy prior to insurance change  Ultrasound of 2019 showed bladder wall thickening  POC UA Negative today

## 2023-03-15 NOTE — ASSESSMENT & PLAN NOTE
Patient here today to reestablish care with a PCP at  [place] [address]  Patient has had postural dizziness with near syncope for over 5 years, pt reports he feels like "he is falling through the floor"  Patient reports symptoms occur with right-sided neck pain which radiates down to the clavicle  Patient reports that when symptoms occur he feels like he may pass out  Patient denies ever passing out  Patient has had multiple work-ups for symptoms  Neuro exam negative today  Patient reports he had an MRI of the brain done in 2019  Patient had vascular carotid study, stress test, EKG, EEG, as well as cervical x-ray which were within normal limits  Patient would like to follow back up with cardiology and neurology  Patient reports that his insurance changed and he needs new referrals back to the specialist   Most recent labs were all normal with the exception of elevated LDL  Will repeat labs at this time  ED precautions discussed  none

## 2023-03-15 NOTE — ASSESSMENT & PLAN NOTE
Patient with history of bladder pain for many years  Patient reports pain feels like bladder fullness  Patient was previously seeing urology and was scheduled for cystoscopy however insurance changed  Patient is requesting a new referral to urology  Ultrasound in 2019 showed mild bladder wall thickening  Point-of-care UA today was negative

## 2023-03-15 NOTE — ASSESSMENT & PLAN NOTE
Patient reports long history of chest tightness and pain associated with dizziness  Patient previously seeing cardiology however insurance changed and he is requesting a new referral   Patient had an EKG, stress test, all of which were normal   ED precautions discussed with patient

## 2023-03-15 NOTE — ASSESSMENT & PLAN NOTE
Encouraged well-balanced diet, limiting sugary beverages, tracking calories  Exercise goal of 30 minutes/day for 5 days a week

## 2023-03-20 ENCOUNTER — TELEPHONE (OUTPATIENT)
Dept: UROLOGY | Facility: MEDICAL CENTER | Age: 33
End: 2023-03-20

## 2023-03-20 NOTE — TELEPHONE ENCOUNTER
Please Triage -   New Patient- WE     What is the reason for the patients appointment? Patient's wife called stating patient was referred to see Urology  Patient is having some pain on bladder  He saw [facility] urology in 2019  He has wall thickening of bladder  Last scan was 2019  Patient scheduled 04/13/23 at 730 am with Magdiel Case     Imaging/Lab Results:      Do we accept the patient's insurance or is the patient Self-Pay? Provider & Plan:   Member ID#: Has the patient had any previous urologist(s)? Yes , [facility] 2019       Have patient records been requested? Has the patient had any outside testing done?       Does the patient have a personal history of cancer?no       Patient can be reached at :

## 2023-03-21 ENCOUNTER — TELEPHONE (OUTPATIENT)
Dept: NEUROLOGY | Facility: CLINIC | Age: 33
End: 2023-03-21

## 2023-03-21 NOTE — TELEPHONE ENCOUNTER
Pt is a establish pt of Dr Adelso Rojas and the patient wife called to schedule him a follow up appt after 4-13-23  I offered her 4-14-23 at 1130 an and 4-24-23 at 1130 am and she accepted the 4-24-23 at 1130 am in [facility]

## 2023-03-27 NOTE — TELEPHONE ENCOUNTER
Per Dr Mariam Vera pt needs 60 minute appt  Offered him 4/25 @ 1:30 or 3:00  Pts wife is going to call back in to r/s appt  MD Kiana Segura Cea  Cc: Garcia Garcia-     Please offer OVL as patient was asked to return in 6 weeks and was lost on follow up  Thanks,   NT           Previous Messages     ----- Message -----   From: Kiana George   Sent: 3/20/2023  12:52 PM EDT   To: Colton King MD, Elan Mendoza   Subject: Current Pt                                       Good afternoon Dr Meli العلي wife called to make a f/u appt with you  Pt has not seen you since 2021 but is still having the same issues  Are you ok to see this pt as an OVS or did you want an OVL? Please let me know how to get him scheduled  Thank you so much     Dar Medina

## 2023-03-28 NOTE — TELEPHONE ENCOUNTER
2nd attempt  Called pt again to let him know we need to r/s his appt due to Dr Shira Carlson needing an hour for the appt   LMOM

## 2023-06-20 ENCOUNTER — OFFICE VISIT (OUTPATIENT)
Dept: SLEEP CENTER | Facility: CLINIC | Age: 33
End: 2023-06-20
Payer: COMMERCIAL

## 2023-06-20 VITALS
WEIGHT: 199 LBS | SYSTOLIC BLOOD PRESSURE: 126 MMHG | HEIGHT: 68 IN | BODY MASS INDEX: 30.16 KG/M2 | HEART RATE: 68 BPM | DIASTOLIC BLOOD PRESSURE: 78 MMHG

## 2023-06-20 DIAGNOSIS — G47.8 SLEEP TALKING: ICD-10-CM

## 2023-06-20 DIAGNOSIS — R06.83 LOUD SNORING: ICD-10-CM

## 2023-06-20 DIAGNOSIS — R53.83 OTHER FATIGUE: ICD-10-CM

## 2023-06-20 DIAGNOSIS — R29.818 SUSPECTED SLEEP APNEA: Primary | ICD-10-CM

## 2023-06-20 DIAGNOSIS — F51.3 SLEEP WALKING: ICD-10-CM

## 2023-06-20 PROCEDURE — 99244 OFF/OP CNSLTJ NEW/EST MOD 40: CPT | Performed by: PHYSICIAN ASSISTANT

## 2023-06-20 NOTE — ASSESSMENT & PLAN NOTE
Patient has symptoms which could be consistent with obstructive sleep apnea  He describes daytime sleepiness, loud snoring, and feeling unrefreshed in the morning  He is also having symptoms of sleepwalking, sleep talking and sitting up in bed  I ordered an RBD sleep study to be done as soon as possible to assess for sleep apnea and/or a REM behavior sleep disorder  Patient will return 2 weeks after the test is completed to review results and possible treatment options

## 2023-06-20 NOTE — PATIENT INSTRUCTIONS
I placed an order for a in lab sleep study to assess for sleep apnea and/or REM behavior sleep disorder  Once the study is completed, I would like to see you back to review the study  If you are found to have sleep apnea, we would like to order CPAP for you  If you do not have sleep apnea, we could discuss possible treatments for your sleepwalking and sleep talking  Nursing Support:  When: Monday through Friday 7A-5PM except holidays  Where: Our direct line is [phone]  If you are having a true emergency please call 911  In the event that the line is busy or it is after hours please leave a voice message and we will return your call  Please speak clearly, leaving your full name, birth date, best number to reach you and the reason for your call  Medication refills: We will need the name of the medication, the dosage, the ordering provider, whether you get a 30 or 90 day refill, and the pharmacy name and address  Medications will be ordered by the provider only  Nurses cannot call in prescriptions  Please allow 7 days for medication refills  Physician requested updates: If your provider requested that you call with an update after starting medication, please be ready to provide us the medication and dosage, what time you take your medication, the time you attempt to fall asleep, time you fall asleep, when you wake up, and what time you get out of bed  Sleep Study Results: We will contact you with sleep study results and/or next steps after the physician has reviewed your testing

## 2023-06-20 NOTE — PROGRESS NOTES
Consultation - [facility], 1990, MRN: [id number]    6/20/2023        Reason for Consult / Principal Problem:    Suspected Obstructive Sleep Apnea  REM behavior sleep disorder      Thank you for the opportunity of participating in the evaluation and care of this patient in the Sleep Clinic at [facility]  Subjective:     HPI: Ankita Nelson is a 34y o  year old male who presents today with his wife upon referral of his PCP for loud snoring and fatigue  Patient's wife states they have been together for 14 years, and he has been snoring loudly since they have been together  However, she states in last few years she feels his snoring has worsened  Patient's wife also describes symptoms of REM behavior sleep disorder to include sleep talking, sitting up in bed with his eyes open and being unaware, and sleepwalking  She states almost on a nightly basis he will sleep talk and sit up in bed  She states a few times a year he will sleep walk but does not leave the bedroom  She states when this occurs she softly asked him to get back into bed, and he does  The patient states he is completely unaware of these episodes  Patient states he is never had a prior sleep evaluation or sleep study  He states for years he has been dealing with episodes of dizziness, which he describes as a falling sensation  He states he saw a cardiologist, neurologist, ENT and had multiple test performed all of which were normal   His primary care doctor recommended a sleep evaluation      Comorbid conditions:  Obesity     Sleep Study Results: No prior studies    CPAP Equipment:  No prior use    Employment:  Works full time daylight, starts at 8AM-5PM, 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, 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 driving     Sleep Schedule:       Bedtime: 9 to 10 PM on workdays, 10 to 11 PM on days off      Latency:  Few minutes       Wakeup time:  5 AM-6 AM     Awakenings:       Frequency: "Once       Causes:  Urination       Duration:  Few minutes     Daytime Sleepiness / Inappropriate Sleep:       Most severe:  Mornings       No Naps      Inappropriate drowsiness / sleep:  Can doze with TV     Snoring:  Loud snoring     Apnea:  None witnessed     Change in Weight:  Stable over last few years     Restless Leg Syndrome:  No clinical symptoms consistent with this diagnosis     Other Complaints:  Last couple year,  + reports of sleep walking, + sleep talking, will  room and talk then gets back into bed, 3-4 times per year, denies sleep paralysis or hallucinations surrounding sleep  Denies waking up with headaches,+bruxism, and dry mouth  Social History:      Caffeine:  None      Tobacco:   reports that he has never smoked  He has never used smokeless tobacco      E-cig/Vaping:    E-Cigarette/Vaping   • E-Cigarette Use Never User       E-Cigarette/Vaping Substances   • Nicotine No    • THC No    • CBD No    • Flavoring No    • Unknown No          Alcohol:   reports current alcohol use of about 3 0 standard drinks of alcohol per week  Drugs:   reports that he does not currently use drugs  The review of systems and following portions of the patient's history were reviewed and updated as appropriate: allergies, current medications, past family history, past medical history, past social history, past surgical history and problem list         Objective:       Vitals:    06/20/23 0939   BP: 126/78   BP Location: Left arm   Patient Position: Sitting   Cuff Size: Large   Pulse: 68   Weight: 90 3 kg (199 lb)   Height: 5' 8\" (1 727 m)     Body mass index is 30 26 kg/m²  Neck Circumference: 15 25  [place] Sleepiness Scale:  Total score: 8      Current Outpatient Medications:   •  benzonatate (TESSALON) 200 MG capsule, Take 1 capsule (200 mg total) by mouth 3 (three) times a day as needed for cough, Disp: 20 capsule, Rfl: 0  •  Cyanocobalamin 1000 MCG SUBL, Place 1 tablet (1,000 mcg total) " under the tongue daily (Patient not taking: Reported on 6/20/2023), Disp: 90 tablet, Rfl: 0    Physical Exam  General Appearance:   Alert, cooperative, no distress, appears stated age, mildly obese     Head:   Normocephalic, without obvious abnormality, atraumatic     Eyes:   PERRL, conjunctiva/corneas clear          Nose:  Nares normal, septum midline, mucosa normal, no drainage or sinus tenderness           Throat:  Lips, teeth and gums normal; tongue normal in size and midline in position; mucosa moist, uvula normal, tonsils 2-3+, Mallampati class 3       Neck:  Supple, symmetrical, trachea midline, no adenopathy; no thyromegaly noted, no carotid bruit or JVD     Lungs:      Clear to auscultation bilaterally, respirations unlabored     Heart:   Regular rate and rhythm, S1 and S2 normal, no murmur, rub or gallop       Extremities:  Extremities normal, atraumatic, no cyanosis or edema       Skin:  Skin color, texture, turgor normal, no rashes or lesions       Neurologic:  No focal deficits noted  ASSESSMENT / PLAN     1  Suspected sleep apnea  Assessment & Plan:  Patient has symptoms which could be consistent with obstructive sleep apnea  He describes daytime sleepiness, loud snoring, and feeling unrefreshed in the morning  He is also having symptoms of sleepwalking, sleep talking and sitting up in bed  I ordered an RBD sleep study to be done as soon as possible to assess for sleep apnea and/or a REM behavior sleep disorder  Patient will return 2 weeks after the test is completed to review results and possible treatment options  Orders:  -     RBD Diagnostic Sleep Study; Future; Expected date: 06/21/2023    2  Other fatigue  -     Ambulatory referral to Sleep Medicine  -     RBD Diagnostic Sleep Study; Future; Expected date: 06/21/2023    3  Loud snoring  -     Ambulatory referral to Sleep Medicine  -     RBD Diagnostic Sleep Study; Future; Expected date: 06/21/2023    4   Sleep talking  -     RBD Diagnostic Sleep Study; Future; Expected date: 06/21/2023    5  Sleep walking  -     RBD Diagnostic Sleep Study; Future; Expected date: 06/21/2023         Counseling / Coordination of Care    I have spent a total time of 50 minutes on 06/20/23 in caring for this patient including Patient and family education, Risk factor reductions, Impressions, Counseling / Coordination of care, Documenting in the medical record, Reviewing / ordering tests, medicine, procedures   and Obtaining or reviewing history    The following instructions have been given to the patient today:    Patient Instructions   I placed an order for a in lab sleep study to assess for sleep apnea and/or REM behavior sleep disorder  Once the study is completed, I would like to see you back to review the study  If you are found to have sleep apnea, we would like to order CPAP for you  If you do not have sleep apnea, we could discuss possible treatments for your sleepwalking and sleep talking  Nursing Support:  When: Monday through Friday 7A-5PM except holidays  Where: Our direct line is [phone]  If you are having a true emergency please call 911  In the event that the line is busy or it is after hours please leave a voice message and we will return your call  Please speak clearly, leaving your full name, birth date, best number to reach you and the reason for your call  Medication refills: We will need the name of the medication, the dosage, the ordering provider, whether you get a 30 or 90 day refill, and the pharmacy name and address  Medications will be ordered by the provider only  Nurses cannot call in prescriptions  Please allow 7 days for medication refills  Physician requested updates:  If your provider requested that you call with an update after starting medication, please be ready to provide us the medication and dosage, what time you take your medication, the time you attempt to fall asleep, time you fall asleep, when you wake up, and what time you get out of bed  Sleep Study Results: We will contact you with sleep study results and/or next steps after the physician has reviewed your testing               YOLANDA Matthews 15

## 2023-08-03 ENCOUNTER — HOSPITAL ENCOUNTER (OUTPATIENT)
Dept: SLEEP CENTER | Facility: CLINIC | Age: 33
Discharge: HOME | End: 2023-08-03
Payer: COMMERCIAL

## 2023-08-03 DIAGNOSIS — R53.83 OTHER FATIGUE: ICD-10-CM

## 2023-08-03 DIAGNOSIS — F51.3 SLEEP WALKING: ICD-10-CM

## 2023-08-03 DIAGNOSIS — R06.83 LOUD SNORING: ICD-10-CM

## 2023-08-03 DIAGNOSIS — G47.8 SLEEP TALKING: ICD-10-CM

## 2023-08-03 DIAGNOSIS — R29.818 SUSPECTED SLEEP APNEA: ICD-10-CM

## 2023-08-03 PROCEDURE — 95810 POLYSOM 6/> YRS 4/> PARAM: CPT

## 2023-08-03 PROCEDURE — 95810 POLYSOM 6/> YRS 4/> PARAM: CPT | Performed by: PSYCHIATRY & NEUROLOGY

## 2023-08-04 NOTE — PROGRESS NOTES
Sleep Study Documentation    Pre-Sleep Study       Sleep testing procedure explained to patient:YES    Patient napped prior to study:NO    [address] worker after 12PM.  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 Sleep Study: Diagnostic   Snore:Mild  Supplemental O2: no      Minimum SaO2 89%  Baseline SaO2 95%            EKG abnormalities: no     EEG abnormalities: no    Sleep Study Recorded < 2 hours: N/A    Sleep Study Recorded > 2 hours but incomplete study: N/A    Sleep Study Recorded 6 hours but no sleep obtained: NO    Patient classification: employed       Post-Sleep Study    Medication used at bedtime or during sleep study:NO    Patient reports time it took to fall asleep:greater than 60 minutes    Patient reports waking up during study:3 or more times. Patient reports returning to sleep in 10 to 30 minutes. Patient reports sleeping less than 2 hours without dreaming. Patient reports sleep during study:worse than usual    Patient rated sleepiness: Very sleepy or tired    PAP treatment:no.

## 2023-08-08 ENCOUNTER — TELEPHONE (OUTPATIENT)
Dept: SLEEP CENTER | Facility: CLINIC | Age: 33
End: 2023-08-08

## 2023-08-08 NOTE — TELEPHONE ENCOUNTER
----- Message from Douglas Daly PA-C sent at 8/8/2023 10:39 AM EDT -----  Please advise the patient his sleep study was completely normal.  Sleep apnea and parasomnias were not noted. If he continues with symptoms, he should schedule a follow-up appointment with myself or Dr. Romelia Purdy.

## 2023-09-13 ENCOUNTER — RA CDI HCC (OUTPATIENT)
Dept: OTHER | Facility: HOSPITAL | Age: 33
End: 2023-09-13

## 2023-09-13 NOTE — PROGRESS NOTES
[address] [facility] coding opportunities       Chart reviewed, no opportunity found: CHART REVIEWED, NO OPPORTUNITY FOUND        Patients Insurance        Commercial Insurance: Ryan Akbar

## 2023-09-14 PROBLEM — R29.818 SUSPECTED SLEEP APNEA: Status: RESOLVED | Noted: 2023-06-20 | Resolved: 2023-09-14

## 2023-09-14 PROBLEM — E55.9 VITAMIN D DEFICIENCY: Status: ACTIVE | Noted: 2023-09-14

## 2023-09-14 NOTE — PROGRESS NOTES
Name: Lilly Riley      : 1990      MRN: [id number]  Encounter Provider: TREVOR Elliott  Encounter Date: 2023   Encounter department: [address]      1. Postural dizziness with near syncope  Assessment & Plan:  Patient reports no change in his symptoms. Recent sleep study and echocardiogram both unremarkable. Patient would like to follow-up with neurology, their office contact information was provided. Physical exam unremarkable today. Patient also expressing concerns regarding partner's recent diagnosis of H. pylori. Patient reports intermittent nausea without vomiting, bloating after eating, and diarrhea with frequent stool color changes. Patient denies blood in stool. Will refer patient to GI per request for further evaluation. 2. Chest tightness    3. Vitamin D deficiency  Assessment & Plan: We will start patient on OTC vitamin D3 supplement, 1000 units daily    Orders:  -     cholecalciferol (VITAMIN D3) 1,000 units tablet; Take 1 tablet (1,000 Units total) by mouth daily    4. Abdominal bloating  Assessment & Plan:  Patient expressing concerns regarding partner's recent diagnosis of H. pylori. Patient reports intermittent nausea without vomiting, bloating after eating, and diarrhea with frequent stool color changes. Patient denies blood in stool. Will refer patient to GI per request for further evaluation. Orders:  -     Ambulatory Referral to Gastroenterology; Future    5. Diarrhea, unspecified type  -     Ambulatory Referral to Gastroenterology; Future          I have spent a total time of 35 minutes on 23 in caring for this patient including Risks and benefits of tx options, Impressions, Documenting in the medical record, Reviewing / ordering tests, medicine, procedures   and Obtaining or reviewing history  .            Subjective      Routine follow-up to discuss recent lab work and reassess chronic conditions  Primary concern today: dizziness    Review of Systems   Constitutional: Negative. HENT: Negative. Eyes: Negative. Respiratory: Negative. Cardiovascular: Negative. Gastrointestinal: Positive for abdominal pain, diarrhea and nausea. Negative for anal bleeding, blood in stool and vomiting. Endocrine: Negative. Genitourinary: Negative. Musculoskeletal: Negative. Skin: Negative. Allergic/Immunologic: Negative. Neurological: Positive for dizziness. Hematological: Negative. Psychiatric/Behavioral: Negative. Current Outpatient Medications on File Prior to Visit   Medication Sig   • Cyanocobalamin 1000 MCG SUBL Place 1 tablet (1,000 mcg total) under the tongue daily (Patient not taking: Reported on 6/20/2023)       Objective     /84 (BP Location: Left arm, Patient Position: Sitting, Cuff Size: Large)   Pulse 80   Temp 98.6 °F (37 °C) (Tympanic)   Resp 16   Ht 5' 8" (1.727 m)   Wt 89.9 kg (198 lb 3.1 oz)   SpO2 99%   BMI 30.14 kg/m²     Physical Exam  Constitutional:       General: He is not in acute distress. Appearance: Normal appearance. He is not ill-appearing, toxic-appearing or diaphoretic. Eyes:      Extraocular Movements: Extraocular movements intact. Conjunctiva/sclera: Conjunctivae normal.      Pupils: Pupils are equal, round, and reactive to light. Cardiovascular:      Rate and Rhythm: Normal rate and regular rhythm. Heart sounds: Normal heart sounds. Pulmonary:      Effort: Pulmonary effort is normal. No respiratory distress. Breath sounds: Normal breath sounds. No wheezing. Abdominal:      General: Abdomen is flat. Musculoskeletal:         General: Normal range of motion. Cervical back: Normal range of motion. Skin:     Findings: No erythema or rash. Neurological:      Mental Status: He is alert and oriented to person, place, and time. Cranial Nerves: Cranial nerves 2-12 are intact.  No cranial nerve deficit or dysarthria. Sensory: Sensation is intact. Motor: Motor function is intact. No pronator drift. Coordination: Coordination is intact. Romberg sign negative. Coordination normal. Finger-Nose-Finger Test and Heel to [facility] Test normal. Rapid alternating movements normal.      Gait: Gait is intact.    Psychiatric:         Mood and Affect: Mood normal.         Behavior: Behavior normal.       TREVOR Chun

## 2023-09-18 ENCOUNTER — OFFICE VISIT (OUTPATIENT)
Age: 33
End: 2023-09-18
Payer: COMMERCIAL

## 2023-09-18 VITALS
OXYGEN SATURATION: 99 % | HEIGHT: 68 IN | HEART RATE: 80 BPM | WEIGHT: 198.19 LBS | BODY MASS INDEX: 30.04 KG/M2 | TEMPERATURE: 98.6 F | DIASTOLIC BLOOD PRESSURE: 84 MMHG | RESPIRATION RATE: 16 BRPM | SYSTOLIC BLOOD PRESSURE: 130 MMHG

## 2023-09-18 DIAGNOSIS — R14.0 ABDOMINAL BLOATING: ICD-10-CM

## 2023-09-18 DIAGNOSIS — R42 POSTURAL DIZZINESS WITH NEAR SYNCOPE: Primary | ICD-10-CM

## 2023-09-18 DIAGNOSIS — E55.9 VITAMIN D DEFICIENCY: ICD-10-CM

## 2023-09-18 DIAGNOSIS — R55 POSTURAL DIZZINESS WITH NEAR SYNCOPE: Primary | ICD-10-CM

## 2023-09-18 DIAGNOSIS — R07.89 CHEST TIGHTNESS: ICD-10-CM

## 2023-09-18 DIAGNOSIS — R19.7 DIARRHEA, UNSPECIFIED TYPE: ICD-10-CM

## 2023-09-18 PROCEDURE — 99214 OFFICE O/P EST MOD 30 MIN: CPT | Performed by: NURSE PRACTITIONER

## 2023-09-18 RX ORDER — MELATONIN
1000 DAILY
Qty: 90 TABLET | Refills: 3
Start: 2023-09-18

## 2023-09-18 NOTE — ASSESSMENT & PLAN NOTE
Patient expressing concerns regarding partner's recent diagnosis of H. pylori. Patient reports intermittent nausea without vomiting, bloating after eating, and diarrhea with frequent stool color changes. Patient denies blood in stool. Will refer patient to GI per request for further evaluation.

## 2023-09-18 NOTE — ASSESSMENT & PLAN NOTE
Patient reports no change in his symptoms. Recent sleep study and echocardiogram both unremarkable. Patient would like to follow-up with neurology, their office contact information was provided. Physical exam unremarkable today. Patient also expressing concerns regarding partner's recent diagnosis of H. pylori. Patient reports intermittent nausea without vomiting, bloating after eating, and diarrhea with frequent stool color changes. Patient denies blood in stool. Will refer patient to GI per request for further evaluation.

## 2023-09-18 NOTE — PATIENT INSTRUCTIONS
[facility] [phone]. ..  Call to schedule a follow-up

## 2024-03-19 ENCOUNTER — OFFICE VISIT (OUTPATIENT)
Age: 34
End: 2024-03-19
Payer: COMMERCIAL

## 2024-03-19 VITALS
RESPIRATION RATE: 18 BRPM | HEIGHT: 68 IN | SYSTOLIC BLOOD PRESSURE: 134 MMHG | DIASTOLIC BLOOD PRESSURE: 86 MMHG | OXYGEN SATURATION: 99 % | WEIGHT: 214.29 LBS | BODY MASS INDEX: 32.48 KG/M2 | HEART RATE: 70 BPM

## 2024-03-19 DIAGNOSIS — R55 POSTURAL DIZZINESS WITH NEAR SYNCOPE: ICD-10-CM

## 2024-03-19 DIAGNOSIS — R42 POSTURAL DIZZINESS WITH NEAR SYNCOPE: ICD-10-CM

## 2024-03-19 DIAGNOSIS — M54.2 NECK PAIN: Primary | ICD-10-CM

## 2024-03-19 PROCEDURE — 99214 OFFICE O/P EST MOD 30 MIN: CPT | Performed by: NURSE PRACTITIONER

## 2024-03-19 RX ORDER — MELOXICAM 7.5 MG/1
7.5 TABLET ORAL DAILY
Qty: 10 TABLET | Refills: 0 | Status: SHIPPED | OUTPATIENT
Start: 2024-03-19 | End: 2024-03-29

## 2024-03-19 RX ORDER — CYCLOBENZAPRINE HCL 5 MG
5 TABLET ORAL 3 TIMES DAILY PRN
Qty: 30 TABLET | Refills: 0 | Status: SHIPPED | OUTPATIENT
Start: 2024-03-19

## 2024-03-19 NOTE — ASSESSMENT & PLAN NOTE
"\"Still comes and goes\"  Has not re-established with neurology   \"I don't have time right now... not a huge concern\"   "

## 2024-03-19 NOTE — PATIENT INSTRUCTIONS
Neck Pain   AMBULATORY CARE:   Neck pain  may be sudden and increase quickly. You may instead feel pain build slowly over time. Neck pain may go away in a few days or weeks, or it may continue for months. The pain may come and go, or be worse with certain movements. The pain may be only in your neck, or it may move to your arms, back, or shoulders. You may also have pain that starts in another body area and moves to your neck. Some types of neck pain are permanent.       Seek care immediately if:   You have an injury that causes neck pain and shooting pain down your arms or legs.    Your neck pain suddenly becomes severe.    You have neck pain along with numbness, tingling, or weakness in your arms or legs.    You have a stiff neck, a headache, and a fever.    Contact your healthcare provider if:   You have new or worsening symptoms.    Your symptoms continue even after treatment.    You have questions or concerns about your condition or care.    Treatment  may include any of the following, depending on what is causing your pain:  Medicines  may be prescribed or recommended by your healthcare provider for pain. You may need medicine to treat nerve pain or to stop muscle spasms. Medicines may also be given to reduce inflammation. Your healthcare provider may inject medicine into a nerve to block pain. Over-the-counter NSAID medicine or acetaminophen may be recommended to help treat minor pain or inflammation.    Traction  is used to relieve pressure from nerves. Your head is gently pulled up and away from your neck. This stretches muscles and ligaments and makes more room for the spine. Your healthcare provider will tell you the kind of traction that will help your neck pain. Do not use traction devices at home unless directed by your healthcare provider.    Surgery  may be needed if the pain is severe or other treatments do not work. Surgery will not help every kind of neck pain. You may need surgery to stabilize a  fractured bone or to remove a tumor. Surgery may also be used to widen a narrowed spinal column or to remove a disc from between neck bones.    Manage or prevent neck pain:   Rest your neck as directed.  Do not make sudden movements, such as turning your head quickly. Your healthcare provider may recommend you wear a cervical collar for a short time. The collar will prevent you from moving your head. This will give your neck time to heal if an injury is causing your neck pain. Ask your provider when you can return to sports or other normal daily activities.    Apply heat as directed.  Heat helps relieve pain and swelling. Use a heat wrap, or soak a small towel in warm water. Wring out the extra water. Apply the heat wrap or towel for 20 minutes every hour, or as directed.    Apply ice as directed.  Ice helps relieve pain and swelling, and can help prevent tissue damage. Use an ice pack, or put ice in a bag. Cover the ice pack or back with a towel before you apply it to your neck. Apply the ice pack or ice for 15 minutes every hour, or as directed. Your provider can tell you how often to apply ice.    Do neck exercises as directed.  Neck exercises help strengthen the muscles and increase range of motion. Your provider will tell you which exercises are right for you. The provider may give you instructions or may recommend that you work with a physical therapist. Your provider or therapist can make sure you are doing the exercises correctly.     Maintain good posture.  Try to keep your head and shoulders lifted when you sit. If you work in front of a computer, make sure the monitor is at the right level. You should not need to look up down to see the screen. You should also not have to lean forward to be able to read what is on the screen. Make sure your keyboard, mouse, and other computer items are placed where you do not have to extend your shoulder to reach them. Get up often if you work in front of a computer or  sit for long periods of time. Stretch or walk around to keep your neck muscles loose.       Follow up with your healthcare provider as directed:  Your healthcare provider may refer you to a specialist if your pain does not get better with treatment. Write down your questions so you remember to ask them during your visits.  © Copyright [employer] 2023 Information is for End User's use only and may not be sold, redistributed or otherwise used for commercial purposes.  The above information is an  only. It is not intended as medical advice for individual conditions or treatments. Talk to your doctor, nurse or pharmacist before following any medical regimen to see if it is safe and effective for you.

## 2024-03-19 NOTE — PROGRESS NOTES
"Name: Kvng Seals      : 1990      MRN: [id number]  Encounter Provider: TREVOR Bauer  Encounter Date: 3/19/2024   Encounter department: [facility] PRIMARY CARE    Assessment & Plan     No radiculopathy symptoms.  Suspecting cervical strain.   Start meloxicam 7.5 mg daily x 10 days with as needed cyclobenzaprine  Provided patient with neck exercises to initiate as well  Follow-up with our office if condition worsens or fails to improve with these measures.  Next step if needed: referral to spine/pain management vs imaging    Discussed what signs and symptoms warrant emergent medical care. Patient verbalized understanding.     Documentation for this encounter was completed with the assistance of dictation software.  Please excuse any grammatical errors.  Please contact the provider if any documentation clarification is necessary.    1. Neck pain  -     meloxicam (Mobic) 7.5 mg tablet; Take 1 tablet (7.5 mg total) by mouth daily for 10 days  -     cyclobenzaprine (FLEXERIL) 5 mg tablet; Take 1 tablet (5 mg total) by mouth 3 (three) times a day as needed for muscle spasms    2. Postural dizziness with near syncope  Assessment & Plan:  \"Still comes and goes\"  Has not re-established with neurology   \"I don't have time right now... not a huge concern\"              Subjective      Neck Pain  Patient complains of neck pain. Event that precipitated these symptoms: none known. Onset of symptoms 3 weeks ago, and have been \"comes and goes\" since that time. Current symptoms are pain in neck (sharp and sore in character; 9/10 in severity at worse). Patient denies numbness, tingling, weakness. Patient has had no prior neck problems. Previous treatments include: heat (\"helps a little\"), ibuprofen (\"not helping).    Triggers: chewing and leaning forward  Cervical xray and carotid studies normal in         Review of Systems   Constitutional: Negative.    HENT: Negative.     Eyes: Negative.  " "  Respiratory: Negative.     Cardiovascular: Negative.    Gastrointestinal: Negative.    Endocrine: Negative.    Genitourinary: Negative.    Musculoskeletal:  Positive for neck pain and neck stiffness.   Skin: Negative.    Allergic/Immunologic: Negative.    Neurological: Negative.    Hematological: Negative.    Psychiatric/Behavioral: Negative.         Current Outpatient Medications on File Prior to Visit   Medication Sig   • cholecalciferol (VITAMIN D3) 1,000 units tablet Take 1 tablet (1,000 Units total) by mouth daily (Patient not taking: Reported on 3/19/2024)   • Cyanocobalamin 1000 MCG SUBL Place 1 tablet (1,000 mcg total) under the tongue daily (Patient not taking: Reported on 6/20/2023)       Objective     /86 (BP Location: Left arm, Patient Position: Sitting, Cuff Size: Large)   Pulse 70   Resp 18   Ht 5' 8\" (1.727 m)   Wt 97.2 kg (214 lb 4.6 oz)   SpO2 99%   BMI 32.58 kg/m²     Physical Exam  Constitutional:       General: He is not in acute distress.     Appearance: Normal appearance.   Eyes:      Extraocular Movements: Extraocular movements intact.      Conjunctiva/sclera: Conjunctivae normal.   Neck:        Comments: Pain L > R  Cardiovascular:      Rate and Rhythm: Normal rate and regular rhythm.      Heart sounds: Normal heart sounds.   Pulmonary:      Effort: Pulmonary effort is normal. No respiratory distress.      Breath sounds: Normal breath sounds. No wheezing.   Abdominal:      General: Abdomen is flat.   Musculoskeletal:         General: Normal range of motion.      Cervical back: Normal range of motion. Crepitus present. Pain with movement, spinous process tenderness and muscular tenderness present. Normal range of motion.   Skin:     Findings: No erythema or rash.   Neurological:      Mental Status: He is alert and oriented to person, place, and time.   Psychiatric:         Mood and Affect: Mood normal.         Behavior: Behavior normal.       TREVOR Bauer    "

## 2024-04-25 ENCOUNTER — OFFICE VISIT (OUTPATIENT)
Age: 34
End: 2024-04-25
Payer: COMMERCIAL

## 2024-04-25 VITALS
HEIGHT: 68 IN | HEART RATE: 95 BPM | RESPIRATION RATE: 18 BRPM | DIASTOLIC BLOOD PRESSURE: 76 MMHG | TEMPERATURE: 96.4 F | SYSTOLIC BLOOD PRESSURE: 120 MMHG | BODY MASS INDEX: 30.62 KG/M2 | OXYGEN SATURATION: 97 % | WEIGHT: 202 LBS

## 2024-04-25 DIAGNOSIS — J06.9 VIRAL URI: Primary | ICD-10-CM

## 2024-04-25 PROCEDURE — 99213 OFFICE O/P EST LOW 20 MIN: CPT

## 2024-04-25 RX ORDER — MULTIVITAMIN
1 TABLET ORAL DAILY
COMMUNITY

## 2024-04-25 RX ORDER — PREDNISONE 10 MG/1
TABLET ORAL
Qty: 21 TABLET | Refills: 0 | Status: SHIPPED | OUTPATIENT
Start: 2024-04-25

## 2024-04-25 NOTE — PATIENT INSTRUCTIONS
At this time you have been diagnosed with a Viral Infection.  Antibiotics are not indicated for viral infections.   Taking antibiotics while you have a viral infection is the wrong treatment for a viral infection.  Viruses are self limiting meaning your body fights it off given sufficient time to do so.  For viral illnesses, the recommendation is for supportive care which would consists of the following:  For decongestion:  Nasal corticosteroid: examples are Flonase or Nasacort.  Nasal saline irrigation  Humidified air  Warm moist air such as a hot cup of water in a mug, sit at the dining room table with the mug on the table, put a towel over your head to cover over the mug and breath in the warm steam (don't drink the fluid in case you have mucus that drips in)  Vicks Vapor Rub  For Cough or sore throat:  Salt water gurgle  Honey  Chloraseptic spray  Throat lozenges  Over the Counter Tylenol or Ibuprofen  Dextromethorphan 30mg PO every 6-8 hours for cough. max 120 mg in 24 hour period     Follow up with Primary Care Provider in 3-5 days if not improving.  Proceed to Emergency Department if symptoms worsen.    If tests have been performed at [facility], our office will contact you with results if changes need to be made to the care plan discussed with you at the visit.  You can review your full results on [facility] MyChart.

## 2024-04-25 NOTE — LETTER
April 25, 2024     Patient: Kvng Seals   YOB: 1990   Date of Visit: 4/25/2024       To Whom it May Concern:    Kvng Seals was seen in my clinic on 4/25/2024. He may return to work on Monday 4/29/2024 but may return sooner if he is feeling better .    If you have any questions or concerns, please don't hesitate to call.         Sincerely,          TREVOR [place]amo        CC: No Recipients

## 2024-04-25 NOTE — PROGRESS NOTES
[facility]        NAME: Kvng Seals is a 33 y.o. male  : 1990    MRN: [id number]  DATE: 2024  TIME: 9:56 AM    Assessment and Plan   Viral URI [J06.9]  1. Viral URI  predniSONE 10 mg tablet            Patient Instructions   At this time you have been diagnosed with a Viral Infection.  Antibiotics are not indicated for viral infections.   Taking antibiotics while you have a viral infection is the wrong treatment for a viral infection.  Viruses are self limiting meaning your body fights it off given sufficient time to do so.  For viral illnesses, the recommendation is for supportive care which would consists of the following:  For decongestion:  Nasal corticosteroid: examples are Flonase or Nasacort.  Nasal saline irrigation  Humidified air  Warm moist air such as a hot cup of water in a mug, sit at the dining room table with the mug on the table, put a towel over your head to cover over the mug and breath in the warm steam (don't drink the fluid in case you have mucus that drips in)  Vicks Vapor Rub  For Cough or sore throat:  Salt water gurgle  Honey  Chloraseptic spray  Throat lozenges  Over the Counter Tylenol or Ibuprofen  Dextromethorphan 30mg PO every 6-8 hours for cough. max 120 mg in 24 hour period     Follow up with Primary Care Provider in 3-5 days if not improving.  Proceed to Emergency Department if symptoms worsen.    If tests have been performed at [facility], our office will contact you with results if changes need to be made to the care plan discussed with you at the visit.  [facility]    Chief Complaint     Chief Complaint   Patient presents with   • flu like   • Flu Symptoms     X2 days, body aches, fevers, Cough, home covid test neg. Did not get flu shot this year         History of Present Illness       Body aches, fever, cough starting 2 days ago.  Tested negative at home for COVID today.  States spouse is not ill.  He does  work as  and so is in and out of peoples homes frequently and unsure if he was exposed to anybody who was sick.  He states the body aches is the worst part and had taken Mucinex cough and cold yesterday.    Flu Symptoms  Associated symptoms include congestion, a sore throat and coughing. Pertinent negatives include no ear pain, eye pain, rhinorrhea, fever, chest pain, shortness of breath, abdominal pain, vomiting or rash.       Review of Systems   Review of Systems   Constitutional:  Negative for chills and fever.   HENT:  Positive for congestion and sore throat. Negative for ear pain, postnasal drip and rhinorrhea.    Eyes:  Negative for pain and visual disturbance.   Respiratory:  Positive for cough. Negative for shortness of breath.    Cardiovascular:  Negative for chest pain and palpitations.   Gastrointestinal:  Negative for abdominal pain and vomiting.   Genitourinary:  Negative for dysuria and hematuria.   Musculoskeletal:  Positive for myalgias. Negative for arthralgias and back pain.   Skin:  Negative for color change and rash.   Neurological:  Negative for dizziness, seizures, syncope, weakness and light-headedness.   All other systems reviewed and are negative.        Current Medications       Current Outpatient Medications:   •  Multiple Vitamin (multivitamin) tablet, Take 1 tablet by mouth daily, Disp: , Rfl:   •  predniSONE 10 mg tablet, There are 21 total tablets: 6 tablets on day 1, 5 tablets on day 2, 4 tablets on day 3, 3 tablets on day 4, 2 tablets on day 5, 1 tablet on day 6., Disp: 21 tablet, Rfl: 0  •  cholecalciferol (VITAMIN D3) 1,000 units tablet, Take 1 tablet (1,000 Units total) by mouth daily (Patient not taking: Reported on 3/19/2024), Disp: 90 tablet, Rfl: 3  •  Cyanocobalamin 1000 MCG SUBL, Place 1 tablet (1,000 mcg total) under the tongue daily (Patient not taking: Reported on 6/20/2023), Disp: 90 tablet, Rfl: 0  •  cyclobenzaprine (FLEXERIL) 5 mg tablet, Take 1 tablet  "(5 mg total) by mouth 3 (three) times a day as needed for muscle spasms (Patient not taking: Reported on 4/25/2024), Disp: 30 tablet, Rfl: 0  •  meloxicam (Mobic) 7.5 mg tablet, Take 1 tablet (7.5 mg total) by mouth daily for 10 days (Patient not taking: Reported on 4/25/2024), Disp: 10 tablet, Rfl: 0    Current Allergies     Allergies as of 04/25/2024   • (No Known Allergies)            The following portions of the patient's history were reviewed and updated as appropriate: allergies, current medications, past family history, past medical history, past social history, past surgical history and problem list.     Past Medical History:   Diagnosis Date   • Allergic    • Depression    • Suspected sleep apnea 6/20/2023   • Vertigo        History reviewed. No pertinent surgical history.    Family History   Problem Relation Age of Onset   • No Known Problems Mother    • No Known Problems Father    • Diabetes Maternal Grandmother          Medications have been verified.        Objective   /76   Pulse 95   Temp (!) 96.4 °F (35.8 °C) (Tympanic)   Resp 18   Ht 5' 8\" (1.727 m)   Wt 91.6 kg (202 lb)   SpO2 97%   BMI 30.71 kg/m²   No LMP for male patient.       Physical Exam     Physical Exam  Vitals and nursing note reviewed.   Constitutional:       Appearance: Normal appearance.   HENT:      Head: Normocephalic and atraumatic.      Right Ear: Tympanic membrane normal.      Left Ear: Tympanic membrane normal.      Nose: Congestion and rhinorrhea present.      Mouth/Throat:      Mouth: Mucous membranes are moist.   Eyes:      Pupils: Pupils are equal, round, and reactive to light.   Cardiovascular:      Rate and Rhythm: Normal rate.      Pulses: Normal pulses.   Pulmonary:      Effort: Pulmonary effort is normal.      Breath sounds: Normal breath sounds. No wheezing, rhonchi or rales.   Musculoskeletal:      Cervical back: Normal range of motion and neck supple. No rigidity.   Skin:     General: Skin is warm and " dry.      Capillary Refill: Capillary refill takes less than 2 seconds.   Neurological:      General: No focal deficit present.      Mental Status: He is alert and oriented to person, place, and time. Mental status is at baseline.      Sensory: No sensory deficit.      Motor: No weakness.   Psychiatric:         Mood and Affect: Mood normal.         Behavior: Behavior normal.         Thought Content: Thought content normal.

## 2024-10-15 ENCOUNTER — TELEPHONE (OUTPATIENT)
Age: 34
End: 2024-10-15

## 2025-03-10 ENCOUNTER — OFFICE VISIT (OUTPATIENT)
Age: 35
End: 2025-03-10
Payer: COMMERCIAL

## 2025-03-10 VITALS
BODY MASS INDEX: 31.1 KG/M2 | TEMPERATURE: 98.6 F | SYSTOLIC BLOOD PRESSURE: 118 MMHG | HEIGHT: 69 IN | RESPIRATION RATE: 16 BRPM | DIASTOLIC BLOOD PRESSURE: 74 MMHG | OXYGEN SATURATION: 98 % | HEART RATE: 90 BPM | WEIGHT: 210 LBS

## 2025-03-10 DIAGNOSIS — R68.89 FLU-LIKE SYMPTOMS: Primary | ICD-10-CM

## 2025-03-10 PROCEDURE — 99213 OFFICE O/P EST LOW 20 MIN: CPT | Performed by: EMERGENCY MEDICINE

## 2025-03-10 RX ORDER — PREDNISONE 10 MG/1
TABLET ORAL
Qty: 21 TABLET | Refills: 0 | Status: SHIPPED | OUTPATIENT
Start: 2025-03-10

## 2025-03-10 NOTE — PATIENT INSTRUCTIONS
Flulike illness      You have been diagnosed with a Flu like illness and your symptoms should resolve over the next 7 to 10 days with the treatments recommended today.  If they do not, it is possible that you have developed a bacterial infection and you should return. If you were to take an antibiotic while you are still in the viral stage, you will not get better any faster, but could kill off many of the good germs in your body as well as make the germs in you resistant to the antibiotic.    Take an expectorant - guaifenesin should be the only ingredient - during the day, and the cough suppressant (ex. Robitussin DM or Tessalon) if needed at night only.   Take Zinc 25 mg every 12 hours for the next week (the dose is important so do not just take a multivitamin with zinc or an over-the-counter cold med with zinc such as Airborne or Zicam, as that will not give you the sufficient dose).    You should also take vitamin D3 5000 i.u.s per day for the next 1 week, and vitamin-C 1 g twice daily (and again dosages are important, do not think you are getting enough vitamin C just by drinking extra orange juice).  May take Flonase as discussed.  You may also take a decongestant like Sudafed, unless you have hypertension or cardiac disease.  You may take Imodium for diarrhea according to package instructions.     If you are diabetic you should adhere strictly to your diabetic diet and monitor blood sugar closely while on prednisone and you should discontinue the prednisone if blood sugar becomes significantly elevated.  Avoid nonsteroidal anti-inflammatories like Advil or Aleve while on prednisone.     Although bothersome, mucous is not necessarily a bad thing. Production of mucous is the body's way of trying to capture and flush irritants from mucosal surfaces. Yellow or green mucous does not necessarily mean you have a bacterial infection. Mucous will become more discolored over time, especially first thing in the  morning, as your body's immune system floods the mucosal surfaces with white bloods cells to try and help fight infection. This white blood cell debride can also cause mucous to be discolored. Again, using nasal saline spray frequently may help soothe and keep mucous flowing out versus getting dried, thickened and / or stuck leading to more sinus pain and pressure.      If you have a cough, please realize that a cough is not necessarily a bad thing. It is a part of your body's protection mechanism to help keep your airways clear. Phlegm may be more discolored in the morning. Please note that discolored phlegm does not necessarily mean a bacterial infection.        Follow up with PCP in 3-5 days.  Proceed to ER if symptoms worsen.    AMBULATORY CARE:   Flu-like illness is an infection caused by a virus. The flu is easily spread when an infected person coughs, sneezes, or has close contact with others. You may be able to spread the flu to others for 1 week or longer after signs or symptoms appear.   Common signs and symptoms include the following:   Fever and chills    Headaches, body aches, and muscle or joint pain    Cough, runny nose, and sore throat    Loss of appetite, nausea, vomiting, or diarrhea    Tiredness    Trouble breathing  Call 911 for any of the following:   You have trouble breathing, and your lips look purple or blue.    You have a seizure.  Seek care immediately if:   You are dizzy, or you are urinating less or not at all.     You have a headache with a stiff neck, and you feel tired or confused.    You have new pain or pressure in your chest.    Your symptoms, such as shortness of breath, vomiting, or diarrhea, get worse.     Your symptoms, such as fever and coughing, seem to get better, but then get worse.  Contact your healthcare provider if:   You have new muscle pain or weakness.    You have questions or concerns about your condition or care.  Treatment for influenza  may include any of the  following:  Acetaminophen decreases pain and fever. It is available without a doctor's order. Ask how much to take and how often to take it. Follow directions. Acetaminophen can cause liver damage if not taken correctly.    NSAIDs  such as ibuprofen, help decrease swelling, pain, and fever. This medicine is available with or without a doctor's order. NSAIDs can cause stomach bleeding or kidney problems in certain people. If you take blood thinner medicine, always ask your healthcare provider if NSAIDs are safe for you. Always read the medicine label and follow directions.    Antivirals  help fight a viral infection.  Manage your symptoms:   Rest  as much as you can to help you recover.    Drink liquids as directed  to help prevent dehydration. Ask how much liquid to drink each day and which liquids are best for you.  Prevent the spread of the flu:   Wash your hands often.  Use soap and water. Wash your hands after you use the bathroom, change a child's diapers, or sneeze. Wash your hands before you prepare or eat food. Use gel hand cleanser when soap and water are not available. Do not touch your eyes, nose, or mouth unless you have washed your hands first.       Cover your mouth when you sneeze or cough.  Cough into a tissue or the bend of your arm.    Clean shared items with a germ-killing .  Clean table surfaces, doorknobs, and light switches. Do not share towels, silverware, and dishes with people who are sick. Wash bed sheets, towels, silverware, and dishes with soap and water.     Wear a mask  over your mouth and nose if you are sick or are near anyone who is sick.     Stay away from others  if you are sick.     Follow up with your healthcare provider as directed:  Write down your questions so you remember to ask them during your visits.   © 2017 [employer] Information is for End User's use only and may not be sold, redistributed or otherwise used for commercial purposes. All  illustrations and images included in CareNotes® are the copyrighted property of [employer]. or [employer]  The above information is an  only. It is not intended as medical advice for individual conditions or treatments. Talk to your doctor, nurse or pharmacist before following any medical regimen to see if it is safe and effective for you.  Patient Education     Fever in adults - Discharge instructions   The Basics   Written by the doctors and editors at [facility]   What are discharge instructions? -- Discharge instructions are information about how to take care of yourself after getting medical care for a health problem.  What is a fever? -- A fever is a rise in body temperature that goes above a certain level. In general, a fever means a temperature above 100.4°F (38°C). You might get slightly different numbers depending on how you take your temperature: oral (mouth), armpit, ear, forehead, or rectal.  What causes fever? -- The most common cause of fever in adults is infection. Infections that can cause fever include:   COVID-19   A cold or flu   A lung infection, such as pneumonia   A stomach virus  How do I care for myself at home? -- Ask the doctor or nurse what you should do when you go home. Make sure that you understand exactly what you need to do to care for yourself. Ask questions if there is anything you do not understand.  You should also:   Drink lots of water, juice, or broth to replace fluids lost from the fever.   Dress in lightweight clothes. Use a sheet or light blanket if you are cold.   Take medicine like acetaminophen (sample brand name: Tylenol) or ibuprofen (sample brand names: Advil, Motrin) to help bring down your fever.   Stay at home until your fever is gone for 24 hours without the use of fever-reducing medicines. If you had an infection, this helps prevent it from spreading to other people.   Wash your hands often (figure 1). Cough or sneeze into a tissue  or your elbow instead of your hands. When around others, you might also want to wear a face mask. These steps can help keep the people around you healthy.  What follow-up care do I need? -- Your doctor or nurse will tell you if you need to make a follow-up appointment. If so, make sure that you know when and where to go.  When should I call the doctor? -- Call for emergency help right away (in the [place] and [place], call 9-1-1) if you:   Have a fever of 100.4°F (38°C) or higher, and other symptoms like:   Trouble breathing   Severe headache and neck stiffness   Confusion   Seizure   You have signs of severe fluid loss, such as:   You have no urine for more than 8 hours.   You feel very lightheaded or like you are going to pass out.   You feel weak, like you are going to fall.  Call for advice if:   You have a fever of 100.4°F (38°C) or higher that lasts for several days or keeps coming back.   You develop early signs of fluid loss, such as:   Dark-colored urine   Dry mouth   Muscle cramps   Lack of energy   Feeling lightheaded when you get up   You have a rash.   You have stomach pain, vomiting, or diarrhea.   You have new or worsening symptoms.  All topics are updated as new evidence becomes available and our peer review process is complete.  This topic retrieved from [employer] on: Apr 11, 2024.  Topic 184201 Version 1.0  Release: 32.3.2 - C32.100  © 2024 UpToDate, Inc. and/or its affiliates. All rights reserved.  figure 1: How to wash your hands     Wet your hands with clean water, and apply a small amount of soap. Lather and rub hands together for at least 20 seconds. Clean your wrists, palms, backs of your hands, between your fingers, tips of your fingers, thumbs, and under and around your nails. Rinse well, and dry your hands using a clean towel.  Graphic 852469 Version 7.0  Consumer Information Use and Disclaimer   Disclaimer: This generalized information is a limited summary of diagnosis, treatment, and/or  medication information. It is not meant to be comprehensive and should be used as a tool to help the user understand and/or assess potential diagnostic and treatment options. It does NOT include all information about conditions, treatments, medications, side effects, or risks that may apply to a specific patient. It is not intended to be medical advice or a substitute for the medical advice, diagnosis, or treatment of a health care provider based on the health care provider's examination and assessment of a patient's specific and unique circumstances. Patients must speak with a health care provider for complete information about their health, medical questions, and treatment options, including any risks or benefits regarding use of medications. This information does not endorse any treatments or medications as safe, effective, or approved for treating a specific patient. UpToDate, Inc. and its affiliates disclaim any warranty or liability relating to this information or the use thereof.The use of this information is governed by the Terms of Use, available at https://www.[employer].com/en/know/clinical-effectiveness-terms. 2024© UpToDate, Inc. and its affiliates and/or licensors. All rights reserved.  Copyright   © 2024 Up[employer]Date, Inc. and/or its affiliates. All rights reserved.

## 2025-03-10 NOTE — PROGRESS NOTES
[facility]        NAME: Kvng Seals is a 34 y.o. male  : 1990    MRN: [id number]  DATE: March 10, 2025  TIME: 9:27 AM    Assessment and Plan   Flu-like symptoms [R68.89]  1. Flu-like symptoms  predniSONE 10 mg tablet            Patient Instructions     Patient Instructions     Flulike illness      You have been diagnosed with a Flu like illness and your symptoms should resolve over the next 7 to 10 days with the treatments recommended today.  If they do not, it is possible that you have developed a bacterial infection and you should return. If you were to take an antibiotic while you are still in the viral stage, you will not get better any faster, but could kill off many of the good germs in your body as well as make the germs in you resistant to the antibiotic.    Take an expectorant - guaifenesin should be the only ingredient - during the day, and the cough suppressant (ex. Robitussin DM or Tessalon) if needed at night only.   Take Zinc 25 mg every 12 hours for the next week (the dose is important so do not just take a multivitamin with zinc or an over-the-counter cold med with zinc such as Airborne or Zicam, as that will not give you the sufficient dose).    You should also take vitamin D3 5000 i.u.s per day for the next 1 week, and vitamin-C 1 g twice daily (and again dosages are important, do not think you are getting enough vitamin C just by drinking extra orange juice).  May take Flonase as discussed.  You may also take a decongestant like Sudafed, unless you have hypertension or cardiac disease.  You may take Imodium for diarrhea according to package instructions.     If you are diabetic you should adhere strictly to your diabetic diet and monitor blood sugar closely while on prednisone and you should discontinue the prednisone if blood sugar becomes significantly elevated.  Avoid nonsteroidal anti-inflammatories like Advil or Aleve while on prednisone.     Although bothersome, mucous is  not necessarily a bad thing. Production of mucous is the body's way of trying to capture and flush irritants from mucosal surfaces. Yellow or green mucous does not necessarily mean you have a bacterial infection. Mucous will become more discolored over time, especially first thing in the morning, as your body's immune system floods the mucosal surfaces with white bloods cells to try and help fight infection. This white blood cell debride can also cause mucous to be discolored. Again, using nasal saline spray frequently may help soothe and keep mucous flowing out versus getting dried, thickened and / or stuck leading to more sinus pain and pressure.      If you have a cough, please realize that a cough is not necessarily a bad thing. It is a part of your body's protection mechanism to help keep your airways clear. Phlegm may be more discolored in the morning. Please note that discolored phlegm does not necessarily mean a bacterial infection.        Follow up with PCP in 3-5 days.  Proceed to ER if symptoms worsen.    AMBULATORY CARE:   Flu-like illness is an infection caused by a virus. The flu is easily spread when an infected person coughs, sneezes, or has close contact with others. You may be able to spread the flu to others for 1 week or longer after signs or symptoms appear.   Common signs and symptoms include the following:   Fever and chills    Headaches, body aches, and muscle or joint pain    Cough, runny nose, and sore throat    Loss of appetite, nausea, vomiting, or diarrhea    Tiredness    Trouble breathing  Call 911 for any of the following:   You have trouble breathing, and your lips look purple or blue.    You have a seizure.  Seek care immediately if:   You are dizzy, or you are urinating less or not at all.     You have a headache with a stiff neck, and you feel tired or confused.    You have new pain or pressure in your chest.    Your symptoms, such as shortness of breath, vomiting, or diarrhea, get  worse.     Your symptoms, such as fever and coughing, seem to get better, but then get worse.  Contact your healthcare provider if:   You have new muscle pain or weakness.    You have questions or concerns about your condition or care.  Treatment for influenza  may include any of the following:  Acetaminophen decreases pain and fever. It is available without a doctor's order. Ask how much to take and how often to take it. Follow directions. Acetaminophen can cause liver damage if not taken correctly.    NSAIDs  such as ibuprofen, help decrease swelling, pain, and fever. This medicine is available with or without a doctor's order. NSAIDs can cause stomach bleeding or kidney problems in certain people. If you take blood thinner medicine, always ask your healthcare provider if NSAIDs are safe for you. Always read the medicine label and follow directions.    Antivirals  help fight a viral infection.  Manage your symptoms:   Rest  as much as you can to help you recover.    Drink liquids as directed  to help prevent dehydration. Ask how much liquid to drink each day and which liquids are best for you.  Prevent the spread of the flu:   Wash your hands often.  Use soap and water. Wash your hands after you use the bathroom, change a child's diapers, or sneeze. Wash your hands before you prepare or eat food. Use gel hand cleanser when soap and water are not available. Do not touch your eyes, nose, or mouth unless you have washed your hands first.       Cover your mouth when you sneeze or cough.  Cough into a tissue or the bend of your arm.    Clean shared items with a germ-killing .  Clean table surfaces, doorknobs, and light switches. Do not share towels, silverware, and dishes with people who are sick. Wash bed sheets, towels, silverware, and dishes with soap and water.     Wear a mask  over your mouth and nose if you are sick or are near anyone who is sick.     Stay away from others  if you are sick.     Follow up  with your healthcare provider as directed:  Write down your questions so you remember to ask them during your visits.   © 2017 [employer] Information is for End User's use only and may not be sold, redistributed or otherwise used for commercial purposes. All illustrations and images included in CareNotes® are the copyrighted property of [employer] or [employer].  The above information is an  only. It is not intended as medical advice for individual conditions or treatments. Talk to your doctor, nurse or pharmacist before following any medical regimen to see if it is safe and effective for you.  Patient Education     Fever in adults - Discharge instructions   The Basics   Written by the doctors and editors at [facility]   What are discharge instructions? -- Discharge instructions are information about how to take care of yourself after getting medical care for a health problem.  What is a fever? -- A fever is a rise in body temperature that goes above a certain level. In general, a fever means a temperature above 100.4°F (38°C). You might get slightly different numbers depending on how you take your temperature: oral (mouth), armpit, ear, forehead, or rectal.  What causes fever? -- The most common cause of fever in adults is infection. Infections that can cause fever include:   COVID-19   A cold or flu   A lung infection, such as pneumonia   A stomach virus  How do I care for myself at home? -- Ask the doctor or nurse what you should do when you go home. Make sure that you understand exactly what you need to do to care for yourself. Ask questions if there is anything you do not understand.  You should also:   Drink lots of water, juice, or broth to replace fluids lost from the fever.   Dress in lightweight clothes. Use a sheet or light blanket if you are cold.   Take medicine like acetaminophen (sample brand name: Tylenol) or ibuprofen (sample brand names: Advil, Motrin)  to help bring down your fever.   Stay at home until your fever is gone for 24 hours without the use of fever-reducing medicines. If you had an infection, this helps prevent it from spreading to other people.   Wash your hands often (figure 1). Cough or sneeze into a tissue or your elbow instead of your hands. When around others, you might also want to wear a face mask. These steps can help keep the people around you healthy.  What follow-up care do I need? -- Your doctor or nurse will tell you if you need to make a follow-up appointment. If so, make sure that you know when and where to go.  When should I call the doctor? -- Call for emergency help right away (in the [place] and [place], call 9-1-1) if you:   Have a fever of 100.4°F (38°C) or higher, and other symptoms like:   Trouble breathing   Severe headache and neck stiffness   Confusion   Seizure   You have signs of severe fluid loss, such as:   You have no urine for more than 8 hours.   You feel very lightheaded or like you are going to pass out.   You feel weak, like you are going to fall.  Call for advice if:   You have a fever of 100.4°F (38°C) or higher that lasts for several days or keeps coming back.   You develop early signs of fluid loss, such as:   Dark-colored urine   Dry mouth   Muscle cramps   Lack of energy   Feeling lightheaded when you get up   You have a rash.   You have stomach pain, vomiting, or diarrhea.   You have new or worsening symptoms.  All topics are updated as new evidence becomes available and our peer review process is complete.  This topic retrieved from [employer] on: Apr 11, 2024.  Topic 135949 Version 1.0  Release: 32.3.2 - C32.100  © 2024 UpToDate, Inc. and/or its affiliates. All rights reserved.  figure 1: How to wash your hands     Wet your hands with clean water, and apply a small amount of soap. Lather and rub hands together for at least 20 seconds. Clean your wrists, palms, backs of your hands, between your fingers, tips of your  fingers, thumbs, and under and around your nails. Rinse well, and dry your hands using a clean towel.  Graphic 633455 Version 7.0  Consumer Information Use and Disclaimer   Disclaimer: This generalized information is a limited summary of diagnosis, treatment, and/or medication information. It is not meant to be comprehensive and should be used as a tool to help the user understand and/or assess potential diagnostic and treatment options. It does NOT include all information about conditions, treatments, medications, side effects, or risks that may apply to a specific patient. It is not intended to be medical advice or a substitute for the medical advice, diagnosis, or treatment of a health care provider based on the health care provider's examination and assessment of a patient's specific and unique circumstances. Patients must speak with a health care provider for complete information about their health, medical questions, and treatment options, including any risks or benefits regarding use of medications. This information does not endorse any treatments or medications as safe, effective, or approved for treating a specific patient. UpToDate, Inc. and its affiliates disclaim any warranty or liability relating to this information or the use thereof.The use of this information is governed by the Terms of Use, available at https://www.[employer].com/en/know/clinical-effectiveness-terms. 2024© UpToDate, Inc. and its affiliates and/or licensors. All rights reserved.  Copyright   © 2024 UpToDate, Inc. and/or its affiliates. All rights reserved.        Follow up with PCP in 3-5 days.  Proceed to  ER if symptoms worsen.    Chief Complaint     Chief Complaint   Patient presents with    Flu Symptoms     Started Friday afternoon. Sore throat, body aches, cough with pressure in head and eyes, fever max 101F this morning. Taking tylenol.          History of Present Illness       Patient complains of sore throat, cough,  congestion, fevers, generalized aches for the past 3 days.    Flu Symptoms  Associated symptoms include congestion, rhinorrhea and coughing. Pertinent negatives include no ear discharge, headaches, hearing loss, fever, shortness of breath, wheezing, diarrhea or vomiting.       Review of Systems   Review of Systems   Constitutional:  Negative for chills and fever.   HENT:  Positive for congestion, rhinorrhea and sinus pressure. Negative for ear discharge and hearing loss.    Respiratory:  Positive for cough. Negative for chest tightness, shortness of breath and wheezing.    Gastrointestinal:  Negative for diarrhea and vomiting.   Musculoskeletal:  Positive for myalgias. Negative for neck stiffness.   Skin:  Negative for pallor.   Neurological:  Negative for headaches.         Current Medications       Current Outpatient Medications:     predniSONE 10 mg tablet, Take once daily all day's pills on this schedule  6- 5- 4- 3- 2- 1, Disp: 21 tablet, Rfl: 0    cholecalciferol (VITAMIN D3) 1,000 units tablet, Take 1 tablet (1,000 Units total) by mouth daily (Patient not taking: Reported on 3/10/2025), Disp: 90 tablet, Rfl: 3    Cyanocobalamin 1000 MCG SUBL, Place 1 tablet (1,000 mcg total) under the tongue daily (Patient not taking: Reported on 3/10/2025), Disp: 90 tablet, Rfl: 0    cyclobenzaprine (FLEXERIL) 5 mg tablet, Take 1 tablet (5 mg total) by mouth 3 (three) times a day as needed for muscle spasms (Patient not taking: Reported on 3/10/2025), Disp: 30 tablet, Rfl: 0    meloxicam (Mobic) 7.5 mg tablet, Take 1 tablet (7.5 mg total) by mouth daily for 10 days (Patient not taking: Reported on 4/25/2024), Disp: 10 tablet, Rfl: 0    Multiple Vitamin (multivitamin) tablet, Take 1 tablet by mouth daily (Patient not taking: Reported on 3/10/2025), Disp: , Rfl:     predniSONE 10 mg tablet, There are 21 total tablets: 6 tablets on day 1, 5 tablets on day 2, 4 tablets on day 3, 3 tablets on day 4, 2 tablets on day 5, 1 tablet  "on day 6. (Patient not taking: Reported on 3/10/2025), Disp: 21 tablet, Rfl: 0    Current Allergies     Allergies as of 03/10/2025    (No Known Allergies)            The following portions of the patient's history were reviewed and updated as appropriate: allergies, current medications, past family history, past medical history, past social history, past surgical history and problem list.     Past Medical History:   Diagnosis Date    Allergic     Depression     Suspected sleep apnea 06/20/2023    confirmed negative from sleep study per patient.    Vertigo        History reviewed. No pertinent surgical history.    Family History   Problem Relation Age of Onset    No Known Problems Mother     No Known Problems Father     Diabetes Maternal Grandmother          Medications have been verified.        Objective   /74 (Patient Position: Sitting)   Pulse 90   Temp 98.6 °F (37 °C) (Oral)   Resp 16   Ht 5' 9\" (1.753 m)   Wt 95.3 kg (210 lb)   SpO2 98%   BMI 31.01 kg/m²        Physical Exam     Physical Exam  Vitals and nursing note reviewed.   Constitutional:       General: He is not in acute distress.     Appearance: Normal appearance. He is well-developed. He is not ill-appearing or diaphoretic.   HENT:      Head: Normocephalic and atraumatic.      Right Ear: Tympanic membrane, ear canal and external ear normal.      Left Ear: Tympanic membrane, ear canal and external ear normal.      Nose: Mucosal edema and congestion present. No rhinorrhea.      Mouth/Throat:      Pharynx: Posterior oropharyngeal erythema present.   Eyes:      Conjunctiva/sclera: Conjunctivae normal.      Pupils: Pupils are equal, round, and reactive to light.   Cardiovascular:      Rate and Rhythm: Normal rate and regular rhythm.      Heart sounds: Normal heart sounds.   Pulmonary:      Effort: Pulmonary effort is normal. No respiratory distress.      Breath sounds: Normal breath sounds. No wheezing, rhonchi or rales.   Musculoskeletal:      " Cervical back: Neck supple.   Lymphadenopathy:      Cervical: No cervical adenopathy.   Skin:     General: Skin is warm and dry.      Coloration: Skin is not pale.      Findings: No rash.   Neurological:      Mental Status: He is alert and oriented to person, place, and time.   Psychiatric:         Mood and Affect: Mood normal.         Behavior: Behavior normal.         Thought Content: Thought content normal.         Judgment: Judgment normal.